# Patient Record
Sex: FEMALE | Race: WHITE | NOT HISPANIC OR LATINO | Employment: UNEMPLOYED | URBAN - METROPOLITAN AREA
[De-identification: names, ages, dates, MRNs, and addresses within clinical notes are randomized per-mention and may not be internally consistent; named-entity substitution may affect disease eponyms.]

---

## 2020-01-21 ENCOUNTER — HOSPITAL ENCOUNTER (INPATIENT)
Facility: HOSPITAL | Age: 45
LOS: 3 days | Discharge: HOME | End: 2020-01-24
Attending: INTERNAL MEDICINE | Admitting: INTERNAL MEDICINE

## 2020-01-21 ENCOUNTER — APPOINTMENT (OUTPATIENT)
Dept: CT IMAGING | Facility: HOSPITAL | Age: 45
End: 2020-01-21

## 2020-01-21 ENCOUNTER — APPOINTMENT (OUTPATIENT)
Dept: CARDIOLOGY | Facility: HOSPITAL | Age: 45
End: 2020-01-21

## 2020-01-21 ENCOUNTER — HOSPITAL ENCOUNTER (INPATIENT)
Facility: HOSPITAL | Age: 45
LOS: 1 days | Discharge: ANOTHER HOSPITAL | End: 2020-01-21
Attending: EMERGENCY MEDICINE | Admitting: INTERNAL MEDICINE

## 2020-01-21 VITALS
RESPIRATION RATE: 16 BRPM | BODY MASS INDEX: 39.91 KG/M2 | SYSTOLIC BLOOD PRESSURE: 116 MMHG | OXYGEN SATURATION: 91 % | DIASTOLIC BLOOD PRESSURE: 70 MMHG | HEIGHT: 71 IN | TEMPERATURE: 98.2 F | WEIGHT: 285.06 LBS | HEART RATE: 101 BPM

## 2020-01-21 DIAGNOSIS — I26.09 OTHER ACUTE PULMONARY EMBOLISM WITH ACUTE COR PULMONALE (HCC): Primary | ICD-10-CM

## 2020-01-21 DIAGNOSIS — I26.94 MULTIPLE SUBSEGMENTAL PULMONARY EMBOLI WITHOUT ACUTE COR PULMONALE (HCC): Primary | ICD-10-CM

## 2020-01-21 DIAGNOSIS — I82.492 ACUTE DEEP VEIN THROMBOSIS (DVT) OF OTHER SPECIFIED VEIN OF LEFT LOWER EXTREMITY (HCC): ICD-10-CM

## 2020-01-21 PROBLEM — I82.402 ACUTE DEEP VEIN THROMBOSIS (DVT) OF LEFT LOWER EXTREMITY (HCC): Status: ACTIVE | Noted: 2020-01-21

## 2020-01-21 PROBLEM — F41.9 ANXIETY DISORDER: Status: ACTIVE | Noted: 2020-01-21

## 2020-01-21 PROBLEM — R06.09 DYSPNEA ON EXERTION: Status: ACTIVE | Noted: 2020-01-21

## 2020-01-21 LAB
ANION GAP SERPL CALCULATED.3IONS-SCNC: 16 MMOL/L (ref 5–15)
APTT PPP: 16.7 SECONDS (ref 24–31)
APTT PPP: 36.7 SECONDS (ref 22.7–35.4)
BASOPHILS # BLD AUTO: 0.04 10*3/MM3 (ref 0–0.2)
BASOPHILS # BLD AUTO: 0.1 10*3/MM3 (ref 0–0.2)
BASOPHILS NFR BLD AUTO: 0.3 % (ref 0–1.5)
BASOPHILS NFR BLD AUTO: 0.4 % (ref 0–1.5)
BH CV ECHO MEAS - ACS: 2.2 CM
BH CV ECHO MEAS - AO MAX PG (FULL): 5.2 MMHG
BH CV ECHO MEAS - AO MAX PG: 8.2 MMHG
BH CV ECHO MEAS - AO MEAN PG (FULL): 2.3 MMHG
BH CV ECHO MEAS - AO MEAN PG: 4.3 MMHG
BH CV ECHO MEAS - AO ROOT AREA (BSA CORRECTED): 1.5
BH CV ECHO MEAS - AO ROOT AREA: 10 CM^2
BH CV ECHO MEAS - AO ROOT DIAM: 3.6 CM
BH CV ECHO MEAS - AO V2 MAX: 142.8 CM/SEC
BH CV ECHO MEAS - AO V2 MEAN: 96.1 CM/SEC
BH CV ECHO MEAS - AO V2 VTI: 23.5 CM
BH CV ECHO MEAS - ASC AORTA: 3.6 CM
BH CV ECHO MEAS - AVA(I,A): 2.9 CM^2
BH CV ECHO MEAS - AVA(I,D): 2.9 CM^2
BH CV ECHO MEAS - AVA(V,A): 2.6 CM^2
BH CV ECHO MEAS - AVA(V,D): 2.6 CM^2
BH CV ECHO MEAS - BSA(HAYCOCK): 2.6 M^2
BH CV ECHO MEAS - BSA: 2.4 M^2
BH CV ECHO MEAS - BZI_BMI: 39.6 KILOGRAMS/M^2
BH CV ECHO MEAS - BZI_METRIC_HEIGHT: 180.3 CM
BH CV ECHO MEAS - BZI_METRIC_WEIGHT: 128.8 KG
BH CV ECHO MEAS - EDV(CUBED): 37.1 ML
BH CV ECHO MEAS - EDV(MOD-SP2): 72.5 ML
BH CV ECHO MEAS - EDV(MOD-SP4): 60.1 ML
BH CV ECHO MEAS - EDV(TEICH): 45.3 ML
BH CV ECHO MEAS - EF(CUBED): 47.3 %
BH CV ECHO MEAS - EF(MOD-BP): 58 %
BH CV ECHO MEAS - EF(MOD-SP2): 62.2 %
BH CV ECHO MEAS - EF(MOD-SP4): 56.3 %
BH CV ECHO MEAS - EF(TEICH): 40.7 %
BH CV ECHO MEAS - ESV(CUBED): 19.5 ML
BH CV ECHO MEAS - ESV(MOD-SP2): 27.4 ML
BH CV ECHO MEAS - ESV(MOD-SP4): 26.3 ML
BH CV ECHO MEAS - ESV(TEICH): 26.9 ML
BH CV ECHO MEAS - FS: 19.2 %
BH CV ECHO MEAS - IVS/LVPW: 0.95
BH CV ECHO MEAS - IVSD: 1 CM
BH CV ECHO MEAS - LA DIMENSION(2D): 3.3 CM
BH CV ECHO MEAS - LA DIMENSION: 3.6 CM
BH CV ECHO MEAS - LA/AO: 1
BH CV ECHO MEAS - LV DIASTOLIC VOL/BSA (35-75): 24.6 ML/M^2
BH CV ECHO MEAS - LV MASS(C)D: 107 GRAMS
BH CV ECHO MEAS - LV MASS(C)DI: 43.7 GRAMS/M^2
BH CV ECHO MEAS - LV MAX PG: 2.9 MMHG
BH CV ECHO MEAS - LV MEAN PG: 2 MMHG
BH CV ECHO MEAS - LV SYSTOLIC VOL/BSA (12-30): 10.7 ML/M^2
BH CV ECHO MEAS - LV V1 MAX: 85.7 CM/SEC
BH CV ECHO MEAS - LV V1 MEAN: 69.3 CM/SEC
BH CV ECHO MEAS - LV V1 VTI: 15.7 CM
BH CV ECHO MEAS - LVIDD: 3.3 CM
BH CV ECHO MEAS - LVIDS: 2.7 CM
BH CV ECHO MEAS - LVOT AREA: 4.3 CM^2
BH CV ECHO MEAS - LVOT DIAM: 2.3 CM
BH CV ECHO MEAS - LVPWD: 1.1 CM
BH CV ECHO MEAS - MV A MAX VEL: 54.8 CM/SEC
BH CV ECHO MEAS - MV DEC SLOPE: 365.2 CM/SEC^2
BH CV ECHO MEAS - MV DEC TIME: 0.14 SEC
BH CV ECHO MEAS - MV E MAX VEL: 52.3 CM/SEC
BH CV ECHO MEAS - MV E/A: 0.95
BH CV ECHO MEAS - MV MAX PG: 1.7 MMHG
BH CV ECHO MEAS - MV MEAN PG: 0.58 MMHG
BH CV ECHO MEAS - MV V2 MAX: 65.2 CM/SEC
BH CV ECHO MEAS - MV V2 MEAN: 33.7 CM/SEC
BH CV ECHO MEAS - MV V2 VTI: 14 CM
BH CV ECHO MEAS - MVA(VTI): 4.8 CM^2
BH CV ECHO MEAS - PA ACC TIME: 0.09 SEC
BH CV ECHO MEAS - PA MAX PG (FULL): 0.2 MMHG
BH CV ECHO MEAS - PA MAX PG: 2.7 MMHG
BH CV ECHO MEAS - PA PR(ACCEL): 37.5 MMHG
BH CV ECHO MEAS - PA V2 MAX: 81.8 CM/SEC
BH CV ECHO MEAS - RAP SYSTOLE: 8 MMHG
BH CV ECHO MEAS - RV MAX PG: 2.5 MMHG
BH CV ECHO MEAS - RV MEAN PG: 1.5 MMHG
BH CV ECHO MEAS - RV V1 MAX: 78.7 CM/SEC
BH CV ECHO MEAS - RV V1 MEAN: 59.1 CM/SEC
BH CV ECHO MEAS - RV V1 VTI: 14.2 CM
BH CV ECHO MEAS - RVDD: 3.7 CM
BH CV ECHO MEAS - RVSP: 42.2 MMHG
BH CV ECHO MEAS - SI(AO): 95.8 ML/M^2
BH CV ECHO MEAS - SI(CUBED): 7.2 ML/M^2
BH CV ECHO MEAS - SI(LVOT): 27.6 ML/M^2
BH CV ECHO MEAS - SI(MOD-SP2): 18.4 ML/M^2
BH CV ECHO MEAS - SI(MOD-SP4): 13.8 ML/M^2
BH CV ECHO MEAS - SI(TEICH): 7.5 ML/M^2
BH CV ECHO MEAS - SV(AO): 234.5 ML
BH CV ECHO MEAS - SV(CUBED): 17.6 ML
BH CV ECHO MEAS - SV(LVOT): 67.4 ML
BH CV ECHO MEAS - SV(MOD-SP2): 45.1 ML
BH CV ECHO MEAS - SV(MOD-SP4): 33.9 ML
BH CV ECHO MEAS - SV(TEICH): 18.4 ML
BH CV ECHO MEAS - TR MAX VEL: 291.9 CM/SEC
BH CV LOW VAS LEFT GASTRONEMIUS VESSEL: 1
BH CV LOWER VASCULAR LEFT COMMON FEMORAL AUGMENT: NORMAL
BH CV LOWER VASCULAR LEFT COMMON FEMORAL COMPETENT: NORMAL
BH CV LOWER VASCULAR LEFT COMMON FEMORAL COMPRESS: NORMAL
BH CV LOWER VASCULAR LEFT COMMON FEMORAL PHASIC: NORMAL
BH CV LOWER VASCULAR LEFT COMMON FEMORAL SPONT: NORMAL
BH CV LOWER VASCULAR LEFT DISTAL FEMORAL COMPRESS: NORMAL
BH CV LOWER VASCULAR LEFT GASTRONEMIUS COMPRESS: NORMAL
BH CV LOWER VASCULAR LEFT GASTRONEMIUS THROMBUS: NORMAL
BH CV LOWER VASCULAR LEFT GREATER SAPH AK COMPRESS: NORMAL
BH CV LOWER VASCULAR LEFT GREATER SAPH BK COMPRESS: NORMAL
BH CV LOWER VASCULAR LEFT LESSER SAPH COMPRESS: NORMAL
BH CV LOWER VASCULAR LEFT MID FEMORAL AUGMENT: NORMAL
BH CV LOWER VASCULAR LEFT MID FEMORAL COMPETENT: NORMAL
BH CV LOWER VASCULAR LEFT MID FEMORAL COMPRESS: NORMAL
BH CV LOWER VASCULAR LEFT MID FEMORAL PHASIC: NORMAL
BH CV LOWER VASCULAR LEFT MID FEMORAL SPONT: NORMAL
BH CV LOWER VASCULAR LEFT PERONEAL COMPRESS: NORMAL
BH CV LOWER VASCULAR LEFT POPLITEAL AUGMENT: NORMAL
BH CV LOWER VASCULAR LEFT POPLITEAL COMPETENT: NORMAL
BH CV LOWER VASCULAR LEFT POPLITEAL COMPRESS: NORMAL
BH CV LOWER VASCULAR LEFT POPLITEAL PHASIC: NORMAL
BH CV LOWER VASCULAR LEFT POPLITEAL SPONT: NORMAL
BH CV LOWER VASCULAR LEFT POSTERIOR TIBIAL COMPRESS: NORMAL
BH CV LOWER VASCULAR LEFT PROXIMAL FEMORAL COMPRESS: NORMAL
BH CV LOWER VASCULAR LEFT SAPHENOFEMORAL JUNCTION AUGMENT: NORMAL
BH CV LOWER VASCULAR LEFT SAPHENOFEMORAL JUNCTION COMPETENT: NORMAL
BH CV LOWER VASCULAR LEFT SAPHENOFEMORAL JUNCTION COMPRESS: NORMAL
BH CV LOWER VASCULAR LEFT SAPHENOFEMORAL JUNCTION PHASIC: NORMAL
BH CV LOWER VASCULAR LEFT SAPHENOFEMORAL JUNCTION SPONT: NORMAL
BH CV LOWER VASCULAR RIGHT COMMON FEMORAL AUGMENT: NORMAL
BH CV LOWER VASCULAR RIGHT COMMON FEMORAL COMPETENT: NORMAL
BH CV LOWER VASCULAR RIGHT COMMON FEMORAL COMPRESS: NORMAL
BH CV LOWER VASCULAR RIGHT COMMON FEMORAL PHASIC: NORMAL
BH CV LOWER VASCULAR RIGHT COMMON FEMORAL SPONT: NORMAL
BUN BLD-MCNC: 11 MG/DL (ref 6–20)
BUN/CREAT SERPL: 11.7 (ref 7–25)
CALCIUM SPEC-SCNC: 9.9 MG/DL (ref 8.6–10.5)
CHLORIDE SERPL-SCNC: 101 MMOL/L (ref 98–107)
CO2 SERPL-SCNC: 23 MMOL/L (ref 22–29)
CREAT BLD-MCNC: 0.94 MG/DL (ref 0.57–1)
DEPRECATED RDW RBC AUTO: 38.3 FL (ref 37–54)
DEPRECATED RDW RBC AUTO: 39.8 FL (ref 37–54)
EOSINOPHIL # BLD AUTO: 0 10*3/MM3 (ref 0–0.4)
EOSINOPHIL # BLD AUTO: 0.04 10*3/MM3 (ref 0–0.4)
EOSINOPHIL NFR BLD AUTO: 0.2 % (ref 0.3–6.2)
EOSINOPHIL NFR BLD AUTO: 0.3 % (ref 0.3–6.2)
ERYTHROCYTE [DISTWIDTH] IN BLOOD BY AUTOMATED COUNT: 12 % (ref 12.3–15.4)
ERYTHROCYTE [DISTWIDTH] IN BLOOD BY AUTOMATED COUNT: 12.8 % (ref 12.3–15.4)
GFR SERPL CREATININE-BSD FRML MDRD: 65 ML/MIN/1.73
GLUCOSE BLD-MCNC: 109 MG/DL (ref 65–99)
HCT VFR BLD AUTO: 37.6 % (ref 34–46.6)
HCT VFR BLD AUTO: 42.2 % (ref 34–46.6)
HGB BLD-MCNC: 12.5 G/DL (ref 12–15.9)
HGB BLD-MCNC: 14.4 G/DL (ref 12–15.9)
HOLD SPECIMEN: NORMAL
IMM GRANULOCYTES # BLD AUTO: 0.04 10*3/MM3 (ref 0–0.05)
IMM GRANULOCYTES NFR BLD AUTO: 0.3 % (ref 0–0.5)
INR PPP: 0.97 (ref 0.9–1.1)
INR PPP: 1.07 (ref 0.9–1.1)
LV EF 2D ECHO EST: 55 %
LYMPHOCYTES # BLD AUTO: 4 10*3/MM3 (ref 0.7–3.1)
LYMPHOCYTES # BLD AUTO: 6.61 10*3/MM3 (ref 0.7–3.1)
LYMPHOCYTES NFR BLD AUTO: 26.2 % (ref 19.6–45.3)
LYMPHOCYTES NFR BLD AUTO: 43.5 % (ref 19.6–45.3)
MCH RBC QN AUTO: 28.9 PG (ref 26.6–33)
MCH RBC QN AUTO: 30.2 PG (ref 26.6–33)
MCHC RBC AUTO-ENTMCNC: 33.2 G/DL (ref 31.5–35.7)
MCHC RBC AUTO-ENTMCNC: 34.1 G/DL (ref 31.5–35.7)
MCV RBC AUTO: 86.8 FL (ref 79–97)
MCV RBC AUTO: 88.6 FL (ref 79–97)
MONOCYTES # BLD AUTO: 0.67 10*3/MM3 (ref 0.1–0.9)
MONOCYTES # BLD AUTO: 0.9 10*3/MM3 (ref 0.1–0.9)
MONOCYTES NFR BLD AUTO: 4.4 % (ref 5–12)
MONOCYTES NFR BLD AUTO: 5.7 % (ref 5–12)
MRSA DNA SPEC QL NAA+PROBE: NORMAL
NEUTROPHILS # BLD AUTO: 10.4 10*3/MM3 (ref 1.7–7)
NEUTROPHILS # BLD AUTO: 7.78 10*3/MM3 (ref 1.7–7)
NEUTROPHILS NFR BLD AUTO: 51.2 % (ref 42.7–76)
NEUTROPHILS NFR BLD AUTO: 67.5 % (ref 42.7–76)
NRBC BLD AUTO-RTO: 0 /100 WBC (ref 0–0.2)
NRBC BLD AUTO-RTO: 0.1 /100 WBC (ref 0–0.2)
PLATELET # BLD AUTO: 300 10*3/MM3 (ref 140–450)
PLATELET # BLD AUTO: 392 10*3/MM3 (ref 140–450)
PMV BLD AUTO: 7.6 FL (ref 6–12)
PMV BLD AUTO: 9.4 FL (ref 6–12)
POTASSIUM BLD-SCNC: 3.9 MMOL/L (ref 3.5–5.2)
PROTHROMBIN TIME: 10.2 SECONDS (ref 9.6–11.7)
PROTHROMBIN TIME: 13.6 SECONDS (ref 11.7–14.2)
RBC # BLD AUTO: 4.33 10*6/MM3 (ref 3.77–5.28)
RBC # BLD AUTO: 4.76 10*6/MM3 (ref 3.77–5.28)
SODIUM BLD-SCNC: 140 MMOL/L (ref 136–145)
TROPONIN T SERPL-MCNC: 0.04 NG/ML (ref 0–0.03)
WBC NRBC COR # BLD: 15.18 10*3/MM3 (ref 3.4–10.8)
WBC NRBC COR # BLD: 15.4 10*3/MM3 (ref 3.4–10.8)

## 2020-01-21 PROCEDURE — 25010000002 HEPARIN (PORCINE) PER 1000 UNITS: Performed by: INTERNAL MEDICINE

## 2020-01-21 PROCEDURE — 80048 BASIC METABOLIC PNL TOTAL CA: CPT | Performed by: EMERGENCY MEDICINE

## 2020-01-21 PROCEDURE — 85025 COMPLETE CBC W/AUTO DIFF WBC: CPT | Performed by: EMERGENCY MEDICINE

## 2020-01-21 PROCEDURE — 0 IOPAMIDOL PER 1 ML: Performed by: EMERGENCY MEDICINE

## 2020-01-21 PROCEDURE — 85025 COMPLETE CBC W/AUTO DIFF WBC: CPT | Performed by: INTERNAL MEDICINE

## 2020-01-21 PROCEDURE — 85730 THROMBOPLASTIN TIME PARTIAL: CPT | Performed by: INTERNAL MEDICINE

## 2020-01-21 PROCEDURE — 85730 THROMBOPLASTIN TIME PARTIAL: CPT | Performed by: EMERGENCY MEDICINE

## 2020-01-21 PROCEDURE — 84484 ASSAY OF TROPONIN QUANT: CPT | Performed by: NURSE PRACTITIONER

## 2020-01-21 PROCEDURE — 85610 PROTHROMBIN TIME: CPT | Performed by: EMERGENCY MEDICINE

## 2020-01-21 PROCEDURE — 93306 TTE W/DOPPLER COMPLETE: CPT | Performed by: INTERNAL MEDICINE

## 2020-01-21 PROCEDURE — 93306 TTE W/DOPPLER COMPLETE: CPT

## 2020-01-21 PROCEDURE — 71275 CT ANGIOGRAPHY CHEST: CPT

## 2020-01-21 PROCEDURE — 25010000002 SULFUR HEXAFLUORIDE MICROSPH 60.7-25 MG RECONSTITUTED SUSPENSION: Performed by: EMERGENCY MEDICINE

## 2020-01-21 PROCEDURE — 82962 GLUCOSE BLOOD TEST: CPT

## 2020-01-21 PROCEDURE — 25010000002 HEPARIN (PORCINE) PER 1000 UNITS: Performed by: EMERGENCY MEDICINE

## 2020-01-21 PROCEDURE — 93971 EXTREMITY STUDY: CPT

## 2020-01-21 PROCEDURE — 93005 ELECTROCARDIOGRAM TRACING: CPT

## 2020-01-21 PROCEDURE — 87641 MR-STAPH DNA AMP PROBE: CPT | Performed by: INTERNAL MEDICINE

## 2020-01-21 PROCEDURE — 85610 PROTHROMBIN TIME: CPT | Performed by: INTERNAL MEDICINE

## 2020-01-21 PROCEDURE — 83880 ASSAY OF NATRIURETIC PEPTIDE: CPT | Performed by: INTERNAL MEDICINE

## 2020-01-21 PROCEDURE — 93005 ELECTROCARDIOGRAM TRACING: CPT | Performed by: EMERGENCY MEDICINE

## 2020-01-21 PROCEDURE — 99284 EMERGENCY DEPT VISIT MOD MDM: CPT

## 2020-01-21 RX ORDER — MULTIPLE VITAMINS W/ MINERALS TAB 9MG-400MCG
1 TAB ORAL DAILY
COMMUNITY

## 2020-01-21 RX ORDER — ALPRAZOLAM 0.25 MG/1
0.25 TABLET ORAL 2 TIMES DAILY PRN
Status: DISCONTINUED | OUTPATIENT
Start: 2020-01-21 | End: 2020-01-21 | Stop reason: HOSPADM

## 2020-01-21 RX ORDER — HEPARIN SODIUM 10000 [USP'U]/100ML
11.6 INJECTION, SOLUTION INTRAVENOUS
Status: CANCELLED | OUTPATIENT
Start: 2020-01-21

## 2020-01-21 RX ORDER — PANTOPRAZOLE SODIUM 40 MG/1
40 TABLET, DELAYED RELEASE ORAL EVERY MORNING
Status: DISCONTINUED | OUTPATIENT
Start: 2020-01-21 | End: 2020-01-21 | Stop reason: HOSPADM

## 2020-01-21 RX ORDER — MORPHINE SULFATE 2 MG/ML
2 INJECTION, SOLUTION INTRAMUSCULAR; INTRAVENOUS
Status: DISCONTINUED | OUTPATIENT
Start: 2020-01-21 | End: 2020-01-24 | Stop reason: HOSPADM

## 2020-01-21 RX ORDER — HEPARIN SODIUM 10000 [USP'U]/100ML
11.6 INJECTION, SOLUTION INTRAVENOUS
Status: DISCONTINUED | OUTPATIENT
Start: 2020-01-21 | End: 2020-01-21 | Stop reason: HOSPADM

## 2020-01-21 RX ORDER — CETIRIZINE HYDROCHLORIDE 10 MG/1
10 TABLET ORAL DAILY
Status: DISCONTINUED | OUTPATIENT
Start: 2020-01-21 | End: 2020-01-21 | Stop reason: HOSPADM

## 2020-01-21 RX ORDER — SODIUM CHLORIDE 0.9 % (FLUSH) 0.9 %
10 SYRINGE (ML) INJECTION AS NEEDED
Status: CANCELLED | OUTPATIENT
Start: 2020-01-21

## 2020-01-21 RX ORDER — SODIUM CHLORIDE 0.9 % (FLUSH) 0.9 %
10 SYRINGE (ML) INJECTION AS NEEDED
Status: DISCONTINUED | OUTPATIENT
Start: 2020-01-21 | End: 2020-01-21 | Stop reason: HOSPADM

## 2020-01-21 RX ORDER — MULTIVITAMIN,THERAPEUTIC
1 TABLET ORAL DAILY
Status: CANCELLED | OUTPATIENT
Start: 2020-01-22

## 2020-01-21 RX ORDER — PANTOPRAZOLE SODIUM 40 MG/1
40 TABLET, DELAYED RELEASE ORAL EVERY MORNING
Status: CANCELLED | OUTPATIENT
Start: 2020-01-22

## 2020-01-21 RX ORDER — CETIRIZINE HYDROCHLORIDE 10 MG/1
10 TABLET ORAL DAILY
Status: CANCELLED | OUTPATIENT
Start: 2020-01-22

## 2020-01-21 RX ORDER — SODIUM CHLORIDE 0.9 % (FLUSH) 0.9 %
10 SYRINGE (ML) INJECTION EVERY 12 HOURS SCHEDULED
Status: DISCONTINUED | OUTPATIENT
Start: 2020-01-21 | End: 2020-01-21 | Stop reason: HOSPADM

## 2020-01-21 RX ORDER — ZOLPIDEM TARTRATE 5 MG/1
5 TABLET ORAL NIGHTLY PRN
Status: DISCONTINUED | OUTPATIENT
Start: 2020-01-21 | End: 2020-01-24 | Stop reason: HOSPADM

## 2020-01-21 RX ORDER — MULTIVITAMIN,THERAPEUTIC
1 TABLET ORAL DAILY
Status: DISCONTINUED | OUTPATIENT
Start: 2020-01-21 | End: 2020-01-21 | Stop reason: HOSPADM

## 2020-01-21 RX ORDER — ALPRAZOLAM 0.25 MG/1
0.25 TABLET ORAL 2 TIMES DAILY PRN
COMMUNITY
End: 2020-02-25

## 2020-01-21 RX ORDER — ESCITALOPRAM OXALATE 10 MG/1
20 TABLET ORAL NIGHTLY
Status: CANCELLED | OUTPATIENT
Start: 2020-01-21

## 2020-01-21 RX ORDER — CETIRIZINE HYDROCHLORIDE 10 MG/1
10 TABLET ORAL DAILY
COMMUNITY

## 2020-01-21 RX ORDER — ESCITALOPRAM OXALATE 10 MG/1
20 TABLET ORAL NIGHTLY
Status: DISCONTINUED | OUTPATIENT
Start: 2020-01-21 | End: 2020-01-21 | Stop reason: HOSPADM

## 2020-01-21 RX ORDER — OMEPRAZOLE 20 MG/1
20 CAPSULE, DELAYED RELEASE ORAL
COMMUNITY

## 2020-01-21 RX ORDER — ALPRAZOLAM 0.25 MG/1
0.25 TABLET ORAL 2 TIMES DAILY PRN
Status: CANCELLED | OUTPATIENT
Start: 2020-01-21

## 2020-01-21 RX ORDER — SODIUM CHLORIDE 0.9 % (FLUSH) 0.9 %
10 SYRINGE (ML) INJECTION EVERY 12 HOURS SCHEDULED
Status: CANCELLED | OUTPATIENT
Start: 2020-01-21

## 2020-01-21 RX ORDER — HEPARIN SODIUM 10000 [USP'U]/100ML
11.6 INJECTION, SOLUTION INTRAVENOUS
Status: DISCONTINUED | OUTPATIENT
Start: 2020-01-21 | End: 2020-01-23

## 2020-01-21 RX ORDER — ESCITALOPRAM OXALATE 20 MG/1
20 TABLET ORAL NIGHTLY
Status: DISCONTINUED | OUTPATIENT
Start: 2020-01-21 | End: 2020-01-24 | Stop reason: HOSPADM

## 2020-01-21 RX ORDER — ESCITALOPRAM OXALATE 20 MG/1
20 TABLET ORAL
COMMUNITY

## 2020-01-21 RX ADMIN — IOPAMIDOL 100 ML: 755 INJECTION, SOLUTION INTRAVENOUS at 12:30

## 2020-01-21 RX ADMIN — SODIUM CHLORIDE 1000 ML: 0.9 INJECTION, SOLUTION INTRAVENOUS at 11:25

## 2020-01-21 RX ADMIN — HEPARIN SODIUM AND DEXTROSE 11.6 UNITS/KG/HR: 10000; 5 INJECTION INTRAVENOUS at 15:06

## 2020-01-21 RX ADMIN — HEPARIN SODIUM 11.6 UNITS/KG/HR: 10000 INJECTION, SOLUTION INTRAVENOUS at 21:08

## 2020-01-21 RX ADMIN — Medication 10 ML: at 16:21

## 2020-01-21 RX ADMIN — SULFUR HEXAFLUORIDE 2 ML: KIT at 14:10

## 2020-01-21 NOTE — DISCHARGE SUMMARY
Pulmonary/ Critical Care/ sleep medicine discharge summary Note    Date of Discharge:  1/21/2020    Discharge Diagnosis: Acute bilateral pulmonary emboli, acute left lower extremity DVT    Presenting Problem/History of Present Illness  Active Hospital Problems    Diagnosis  POA   • Multiple subsegmental pulmonary emboli without acute cor pulmonale [I26.94]  Yes   • Dyspnea on exertion [R06.09]  Yes   • Acute deep vein thrombosis (DVT) of left lower extremity (CMS/HCC) [I82.402]  Yes   • Anxiety disorder [F41.9]  Yes      Resolved Hospital Problems   No resolved problems to display.          Hospital Course  Admit: 1/21/20    Per H&P:  Merry Melara is a 44 y.o. female with a PMH sig for anxiety presented to the ED with complaints of worsening shortness of air with activity x 2 days.  Shortness of air is better at rest.  She reported some chest discomfort with deep breathing.  She reported no increase in swelling or pain to her left lower extremity.  Of note, she underwent a surgical procedure on 12/26/19 to remove some necrotic bone and cartilage that occurred as a result of a stress fracture.  She also had a tendon release in the same extremity.  CT Chest PE was completed and showed positive for bilateral pulmonary emboli involving all lungs.  A lower extremity venous bilateral doppler study was positive for a left lower extremity DVT.  ECHO completed and report pending. She will be admitted and started on a heparin gtt.      ASSESSMENT & PLAN    Acute submassive Bilateral pulmonary emboli   -s/p recent left lower extremity surgery on 12/26/20, left foot in post op boot  -CT PE study reviewed.  Positive for bilateral PE involving all lungs  -start heparin gtt, will transition to PO at discharge.  Will need at least 6 month of anti-coagulant treatment      Dyspnea on exertion  -secondary to above  -6 minute walk prior to discharge  -can use supplemental oxygen as needed to maintain sats 92%, on RA  -ECHO :  · Right  ventricular cavity is moderate-to-severely dilated.  · Moderately reduced right ventricular systolic function noted.  · RV contractile morphology consistent with positive Paredes sign     Acute deep vein thrombosis (DVT) of left lower extremity (CMS/HCC)  -venous doppler reviewed      Anxiety disorder  -resume home medication        DVT ppy:  -heparin gtt      PPI ppy:  -protonix (home med)     Full Code      RV strain on echo with RV dilation and dysfunction. Given her significant clot burden, she will benefit from EKOS or clot extraction procedure. Will transfer the patient to [facility]. Accepted by Dr. Bazzi.    Procedures Performed         Labs/radiological studies:  Lab Results (last 72 hours)     Procedure Component Value Units Date/Time    MRSA Screen Culture - Swab, Nares [[id number]] Collected:  01/21/20 1621    Specimen:  Swab from Nares Updated:  01/21/20 1647    Extra Tubes [[id number]] Collected:  01/21/20 1450    Specimen:  Blood from Arm, Left Updated:  01/21/20 1600    Narrative:       The following orders were created for panel order Extra Tubes.  Procedure                               Abnormality         Status                     ---------                               -----------         ------                     Gold Top - SST[[id number]]                                   Final result                 Please view results for these tests on the individual orders.    Gold Top - SST [[id number]] Collected:  01/21/20 1450    Specimen:  Blood from Arm, Left Updated:  01/21/20 1600     Extra Tube Hold for add-ons.     Comment: Auto resulted.       aPTT [[id number]]  (Abnormal) Collected:  01/21/20 1449    Specimen:  Blood from Arm, Right Updated:  01/21/20 1511     PTT 16.7 seconds     Protime-INR [[id number]]  (Normal) Collected:  01/21/20 1449    Specimen:  Blood from Arm, Right Updated:  01/21/20 1511     Protime 10.2 Seconds      INR 0.97    Basic Metabolic Panel [[id number]]  (Abnormal)  Collected:  01/21/20 1131    Specimen:  Blood Updated:  01/21/20 1157     Glucose 109 mg/dL      BUN 11 mg/dL      Creatinine 0.94 mg/dL      Sodium 140 mmol/L      Potassium 3.9 mmol/L      Chloride 101 mmol/L      CO2 23.0 mmol/L      Calcium 9.9 mg/dL      eGFR Non African Amer 65 mL/min/1.73      BUN/Creatinine Ratio 11.7     Anion Gap 16.0 mmol/L     Narrative:       GFR Normal >60  Chronic Kidney Disease <60  Kidney Failure <15      CBC & Differential [[id number]] Collected:  01/21/20 1131    Specimen:  Blood Updated:  01/21/20 1135    Narrative:       The following orders were created for panel order CBC & Differential.  Procedure                               Abnormality         Status                     ---------                               -----------         ------                     CBC Auto Differential[[id number]]        Abnormal            Final result                 Please view results for these tests on the individual orders.    CBC Auto Differential [[id number]]  (Abnormal) Collected:  01/21/20 1131    Specimen:  Blood Updated:  01/21/20 1135     WBC 15.40 10*3/mm3      RBC 4.76 10*6/mm3      Hemoglobin 14.4 g/dL      Hematocrit 42.2 %      MCV 88.6 fL      MCH 30.2 pg      MCHC 34.1 g/dL      RDW 12.8 %      RDW-SD 39.8 fl      MPV 7.6 fL      Platelets 392 10*3/mm3      Neutrophil % 67.5 %      Lymphocyte % 26.2 %      Monocyte % 5.7 %      Eosinophil % 0.2 %      Basophil % 0.4 %      Neutrophils, Absolute 10.40 10*3/mm3      Lymphocytes, Absolute 4.00 10*3/mm3      Monocytes, Absolute 0.90 10*3/mm3      Eosinophils, Absolute 0.00 10*3/mm3      Basophils, Absolute 0.10 10*3/mm3      nRBC 0.1 /100 WBC         Ct Chest Pulmonary Embolism    Result Date: 1/21/2020  Large central bilateral pulmonary emboli,, involving all lobes of both lungs. The pulmonary emboli appear near occlusive in the proximal lobar branches. Signs of right heart strain. 2. Emergency room physician was notified prior to the  time of this dictation.    Electronically Signed By-Dr. Edita Crawford MD On:1/21/2020 1:03 PM This report was finalized on [id number] by Dr. Edita Crawford MD.      Consults:   Consults     Date and Time Order Name Status Description    1/21/2020 1429 Intensivist (on-call MD unless specified) Completed           Pertinent Test Results: as listed    Condition on Discharge:  stable    Vital Signs  Temp:  [97.6 °F (36.4 °C)-98.2 °F (36.8 °C)] 98.2 °F (36.8 °C)  Heart Rate:  [100-111] 101  Resp:  [16-20] 16  BP: (104-116)/(65-72) 116/70    Physical Exam:  Constitutional:  Well developed, well nourished, no acute distress, non-toxic appearance   Eyes:  PERRL, conjunctiva normal   HENT:  Atraumatic, external ears normal, nose normal, oropharynx moist, no pharyngeal exudates. Neck- normal range of motion, no tenderness, supple   Respiratory:  No respiratory distress, normal breath sounds, no rales, no wheezing   Cardiovascular:  Normal rate, normal rhythm, no murmurs, no gallops, no rubs   GI:  Soft, nondistended, normal bowel sounds, nontender, no organomegaly, no mass, no rebound, no guarding   :  No costovertebral angle tenderness   Musculoskeletal:  No edema, no tenderness, no deformities.  Left foot in a post surgical boot  Integument:  Well hydrated, no rash   Lymphatic:  No lymphadenopathy noted   Neurologic:  Alert & oriented x 3, CN 2-12 normal, normal motor function, normal sensory function, no focal deficits noted   Psychiatric:  Speech and behavior appropriate     Discharge Disposition  Transfer to Another Facility    Discharge Medications     Discharge Medications      ASK your doctor about these medications      Instructions Start Date   ALPRAZolam 0.25 MG tablet  Commonly known as:  XANAX   0.25 mg, Oral, 2 Times Daily PRN      cetirizine 10 MG tablet  Commonly known as:  zyrTEC   10 mg, Oral, Daily      escitalopram 20 MG tablet  Commonly known as:  LEXAPRO   20 mg, Oral, Every Night at Bedtime       multivitamin with minerals tablet tablet   1 tablet, Oral, Daily      omeprazole 20 MG capsule  Commonly known as:  priLOSEC   20 mg, Oral, Every Night at Bedtime             Discharge Diet: NPO    Activity at Discharge: bedrest    Follow-up Appointments  No future appointments.      Test Results Pending at Discharge   Order Current Status    MRSA Screen Culture - Swab, Nares In process           Time: Discharge 31 min

## 2020-01-21 NOTE — H&P
Pulmonary/ Critical Care/ sleep medicine ADMISSION H&P Note        Patient Name:  Merry Melara    :  1975    Medical Record:  [id number]    Primary Care Physician     Mary Rodgers MD    JAY JAY Melara is a 44 y.o. female with a PMH sig for anxiety presented to the ED with complaints of worsening shortness of air with activity x 2 days.  Shortness of air is better at rest.  She reported some chest discomfort with deep breathing.  She reported no increase in swelling or pain to her left lower extremity.  Of note, she underwent a surgical procedure on 19 to remove some necrotic bone and cartilage that occurred as a result of a stress fracture.  She also had a tendon release in the same extremity.  CT Chest PE was completed and showed positive for bilateral pulmonary emboli involving all lungs.  A lower extremity venous bilateral doppler study was positive for a left lower extremity DVT.  ECHO completed and report pending. She will be admitted and started on a heparin gtt.      Review of Systems    Constitutional:  Denies fever or chills   Eyes:  Denies change in visual acuity   HENT:  Denies nasal congestion or sore throat   Respiratory:  Denies cough + shortness of breath   Cardiovascular:  + chest pain no edema   GI:  Denies abdominal pain, nausea, vomiting, bloody stools or diarrhea   :  Denies dysuria   Musculoskeletal:  Denies back pain or joint pain   Integument:  Denies rash   Neurologic:  Denies headache, focal weakness or sensory changes   Endocrine:  Denies polyuria or polydipsia   Lymphatic:  Denies swollen glands   Psychiatric:  Denies depression + anxiety       Medical History    Past Medical History:   Diagnosis Date   • Anxiety         Surgical History    Past Surgical History:   Procedure Laterality Date   • CHOLECYSTECTOMY     • DILATATION AND CURETTAGE     • TENDON RELEASE     • TONSILLECTOMY          Family History    History reviewed. No pertinent family  history.    Social History    Social History     Tobacco Use   • Smoking status: Former Smoker     Last attempt to quit: 2004     Years since quittin.0   Substance Use Topics   • Alcohol use: Never     Frequency: Never     Comment: social        Allergies    No Known Allergies    Medications    Scheduled Meds:  cetirizine 10 mg Oral Daily   escitalopram 20 mg Oral Daily   heparin 10,000 Units Intravenous Once   [START ON 2020] pantoprazole 40 mg Oral QAM   sodium chloride 10 mL Intravenous Q12H   THERA 1 tablet Oral Daily     Continuous Infusions:  heparin 11.6 Units/kg/hr     PRN Meds:.•  ALPRAZolam  •  heparin  •  heparin  •  [COMPLETED] Insert peripheral IV **AND** sodium chloride  •  sodium chloride      Physical Exam    tMax 24 hrs:  Temp (24hrs), Av.6 °F (36.4 °C), Min:97.6 °F (36.4 °C), Max:97.6 °F (36.4 °C)    Vitals Ranges:  Temp:  [97.6 °F (36.4 °C)] 97.6 °F (36.4 °C)  Heart Rate:  [100-111] 100  Resp:  [20] 20  BP: (104-112)/(65-72) 104/72  Intake and Output Last 3 Shifts:  No intake/output data recorded.    Constitutional:  Well developed, well nourished, no acute distress, non-toxic appearance   Eyes:  PERRL, conjunctiva normal   HENT:  Atraumatic, external ears normal, nose normal, oropharynx moist, no pharyngeal exudates. Neck- normal range of motion, no tenderness, supple   Respiratory:  No respiratory distress, normal breath sounds, no rales, no wheezing   Cardiovascular:  Normal rate, normal rhythm, no murmurs, no gallops, no rubs   GI:  Soft, nondistended, normal bowel sounds, nontender, no organomegaly, no mass, no rebound, no guarding   :  No costovertebral angle tenderness   Musculoskeletal:  No edema, no tenderness, no deformities.  Left foot in a post surgical boot  Integument:  Well hydrated, no rash   Lymphatic:  No lymphadenopathy noted   Neurologic:  Alert & oriented x 3, CN 2-12 normal, normal motor function, normal sensory function, no focal deficits noted   Psychiatric:   Speech and behavior appropriate     labs    Lab Results (last 24 hours)     Procedure Component Value Units Date/Time    Protime-INR [[id number]] Collected:  01/21/20 1449    Specimen:  Blood from Arm, Right Updated:  01/21/20 1454    aPTT [[id number]] Collected:  01/21/20 1449    Specimen:  Blood from Arm, Right Updated:  01/21/20 1454    Extra Tubes [[id number]] Collected:  01/21/20 1450    Specimen:  Blood from Arm, Left Updated:  01/21/20 1454    Narrative:       The following orders were created for panel order Extra Tubes.  Procedure                               Abnormality         Status                     ---------                               -----------         ------                     Gold Top - SST[[id number]]                                   In process                   Please view results for these tests on the individual orders.    Gold Top - SST [[id number]] Collected:  01/21/20 1450    Specimen:  Blood from Arm, Left Updated:  01/21/20 1454    Basic Metabolic Panel [[id number]]  (Abnormal) Collected:  01/21/20 1131    Specimen:  Blood Updated:  01/21/20 1157     Glucose 109 mg/dL      BUN 11 mg/dL      Creatinine 0.94 mg/dL      Sodium 140 mmol/L      Potassium 3.9 mmol/L      Chloride 101 mmol/L      CO2 23.0 mmol/L      Calcium 9.9 mg/dL      eGFR Non African Amer 65 mL/min/1.73      BUN/Creatinine Ratio 11.7     Anion Gap 16.0 mmol/L     Narrative:       GFR Normal >60  Chronic Kidney Disease <60  Kidney Failure <15      CBC & Differential [[id number]] Collected:  01/21/20 1131    Specimen:  Blood Updated:  01/21/20 1135    Narrative:       The following orders were created for panel order CBC & Differential.  Procedure                               Abnormality         Status                     ---------                               -----------         ------                     CBC Auto Differential[[id number]]        Abnormal            Final result                 Please view results for  these tests on the individual orders.    CBC Auto Differential [[id number]]  (Abnormal) Collected:  01/21/20 1131    Specimen:  Blood Updated:  01/21/20 1135     WBC 15.40 10*3/mm3      RBC 4.76 10*6/mm3      Hemoglobin 14.4 g/dL      Hematocrit 42.2 %      MCV 88.6 fL      MCH 30.2 pg      MCHC 34.1 g/dL      RDW 12.8 %      RDW-SD 39.8 fl      MPV 7.6 fL      Platelets 392 10*3/mm3      Neutrophil % 67.5 %      Lymphocyte % 26.2 %      Monocyte % 5.7 %      Eosinophil % 0.2 %      Basophil % 0.4 %      Neutrophils, Absolute 10.40 10*3/mm3      Lymphocytes, Absolute 4.00 10*3/mm3      Monocytes, Absolute 0.90 10*3/mm3      Eosinophils, Absolute 0.00 10*3/mm3      Basophils, Absolute 0.10 10*3/mm3      nRBC 0.1 /100 WBC           Imaging & Other Studies    Imaging Results (Last 72 Hours)     Procedure Component Value Units Date/Time    CT Chest Pulmonary Embolism [[id number]] Collected:  01/21/20 1259     Updated:  01/21/20 1305    Narrative:       CT CHEST PULMONARY EMBOLISM-     Date of Exam: 1/21/2020 12:30 PM     Indication: Short of breath with exertion and recent left lower  extremity surgery.  . Elevated heart rate.     Comparison: None available.     Technique: Serial and axial CT images of the chest were obtained  following the uneventful intravenous administration of 100 cc  Isovue-370. contrast. Reconstructions in the coronal and sagittal planes  were also performed.  Automated exposure control and iterative  reconstruction methods were used.     FINDINGS:     Extensive bilateral pulmonary emboli are present. There is a large  embolism within the right main pulmonary artery, and there is near  occlusive embolism within the proximal right upper, right middle and  right lower lobe pulmonary arteries extending into the subsegmental  branches. There is embolic disease in the distal left main pulmonary  artery, with near occlusive thrombus within the left upper and left  lower lobe pulmonary arteries, extending  into the subsegmental branches.     There is concavity of the interventricular septum the heart suggesting  right heart strain. Heart size is normal. No pericardial effusion.     No acute airspace disease. Benign calcified granuloma within the right  lower lobe.           Cholecystectomy. Small second duodenal segment diverticulum. Remainder  of included upper abdominal organs are within normal limits.     No acute osseous abnormality.          Impression:       Large central bilateral pulmonary emboli,, involving all lobes of both  lungs. The pulmonary emboli appear near occlusive in the proximal lobar  branches. Signs of right heart strain.  2. Emergency room physician was notified prior to the time of this  dictation.           Electronically Signed By-Dr. Edita Crawford MD On:1/21/2020 1:03 PM  This report was finalized on [id number] by Dr. Edita Crawford MD.          Assessment    Acute submassive Bilateral pulmonary emboli   -s/p recent left lower extremity surgery on 12/26/20, left foot in post op boot  -CT PE study reviewed.  Positive for bilateral PE involving all lungs  -start heparin gtt, will transition to PO at discharge.  Will need at least 6 month of anti-coagulant treatment      Dyspnea on exertion  -secondary to above  -6 minute walk prior to discharge  -can use supplemental oxygen as needed to maintain sats 92%, on RA  -ECHO final report pending    Acute deep vein thrombosis (DVT) of left lower extremity (CMS/HCC)  -venous doppler reviewed     Anxiety disorder  -resume home medication      DVT ppy:  -heparin gtt     PPI ppy:  -protonix (home med)    Full Code    Attending physician statement:  Patient is critically ill.  Total critical care time spent is 32 minutes which does not include any time for procedures.  Critical care time is exclusive of time spent by the nurse practitioner.  Above note scribed by nurse practitioner for me and later reviewed by me for accuracy . I've examined the  patient and reviewed all labs and images.    D/w Dr Vidal and reviewed Echo. She does have RV strain on echo with RV dilation and dysfunction. Given her significant clot burden, she will benefit from EKOS or clot extraction procedure. Will transfer the patient to [facility]. Spoke with Dr Bazzi.    I have directly participated in the evaluation and management of this patient.  Jenni Kearney MD

## 2020-01-21 NOTE — ED PROVIDER NOTES
Subjective   History of Present Illness  44-year-old female with a history of anxiety disorder but no history of any lung or heart disease states she is 1 month post tendon release involving the Achilles tendon as well as removal of some avascular necrotic bone in the foot.  The patient has been in an immobilizer type of brace involving the left lower extremity.  She states that over the past 4 days she has had increasing shortness of breath with exertion.  She denies any cough congestion fever chills hemoptysis or chest pain.  The patient denies any sore throat.  There is been no nausea or vomiting.  She has noticed no swelling in the extremities.  Review of Systems    Past Medical History:   Diagnosis Date   • Anxiety        No Known Allergies    Past Surgical History:   Procedure Laterality Date   • CHOLECYSTECTOMY     • DILATATION AND CURETTAGE     • TENDON RELEASE     • TONSILLECTOMY         History reviewed. No pertinent family history.    Social History     Socioeconomic History   • Marital status:      Spouse name: Not on file   • Number of children: Not on file   • Years of education: Not on file   • Highest education level: Not on file   Tobacco Use   • Smoking status: Former Smoker     Last attempt to quit:      Years since quittin.0   Substance and Sexual Activity   • Alcohol use: Never     Frequency: Never     Comment: social   • Drug use: Never   • Sexual activity: Defer           Objective   Physical Exam  Patient is awake and alert she was afebrile vital signs are stable her O2 sat was 94% on room air the HEENT exam is unremarkable neck is supple her chest is clear cardiovascular exam reveals a regular rhythm without a gallop or murmur the abdomen is soft and nontender the patient has a cam walker on the left lower extremity and wounds that are healing well.  There was no edema or point tenderness over the calf or along the medial aspect of the thigh.  Procedures           ED Course                 Results for orders placed or performed during the hospital encounter of 01/21/20   Basic Metabolic Panel   Result Value Ref Range    Glucose 109 (H) 65 - 99 mg/dL    BUN 11 6 - 20 mg/dL    Creatinine 0.94 0.57 - 1.00 mg/dL    Sodium 140 136 - 145 mmol/L    Potassium 3.9 3.5 - 5.2 mmol/L    Chloride 101 98 - 107 mmol/L    CO2 23.0 22.0 - 29.0 mmol/L    Calcium 9.9 8.6 - 10.5 mg/dL    eGFR Non African Amer 65 >60 mL/min/1.73    BUN/Creatinine Ratio 11.7 7.0 - 25.0    Anion Gap 16.0 (H) 5.0 - 15.0 mmol/L   CBC Auto Differential   Result Value Ref Range    WBC 15.40 (H) 3.40 - 10.80 10*3/mm3    RBC 4.76 3.77 - 5.28 10*6/mm3    Hemoglobin 14.4 12.0 - 15.9 g/dL    Hematocrit 42.2 34.0 - 46.6 %    MCV 88.6 79.0 - 97.0 fL    MCH 30.2 26.6 - 33.0 pg    MCHC 34.1 31.5 - 35.7 g/dL    RDW 12.8 12.3 - 15.4 %    RDW-SD 39.8 37.0 - 54.0 fl    MPV 7.6 6.0 - 12.0 fL    Platelets 392 140 - 450 10*3/mm3    Neutrophil % 67.5 42.7 - 76.0 %    Lymphocyte % 26.2 19.6 - 45.3 %    Monocyte % 5.7 5.0 - 12.0 %    Eosinophil % 0.2 (L) 0.3 - 6.2 %    Basophil % 0.4 0.0 - 1.5 %    Neutrophils, Absolute 10.40 (H) 1.70 - 7.00 10*3/mm3    Lymphocytes, Absolute 4.00 (H) 0.70 - 3.10 10*3/mm3    Monocytes, Absolute 0.90 0.10 - 0.90 10*3/mm3    Eosinophils, Absolute 0.00 0.00 - 0.40 10*3/mm3    Basophils, Absolute 0.10 0.00 - 0.20 10*3/mm3    nRBC 0.1 0.0 - 0.2 /100 WBC   ADULT TRANSTHORACIC ECHO COMPLETE W/ CONT IF NECESSARY PER PROTOCOL   Result Value Ref Range    Target HR (85%) 150 bpm    Max. Pred. HR (100%) 176 bpm   Duplex Venous Lower Extremity - Left   Result Value Ref Range    Left GastronemiusSoleal Vessel 1     Right Common Femoral Spont Y     Right Common Femoral Phasic N     Right Common Femoral Augment Y     Right Common Femoral Competent Y     Right Common Femoral Compress C     Left Common Femoral Spont Y     Left Common Femoral Phasic N     Left Common Femoral Augment Y     Left Common Femoral Competent Y     Left Common  Femoral Compress C     Left Saphenofemoral Junction Spont Y     Left Saphenofemoral Junction Phasic N     Left Saphenofemoral Junction Augment Y     Left Saphenofemoral Junction Competent Y     Left Saphenofemoral Junction Compress C     Left Proximal Femoral Compress C     Left Mid Femoral Spont Y     Left Mid Femoral Phasic Y     Left Mid Femoral Augment Y     Left Mid Femoral Competent Y     Left Mid Femoral Compress C     Left Distal Femoral Compress C     Left Popliteal Spont Y     Left Popliteal Phasic Y     Left Popliteal Augment Y     Left Popliteal Competent Y     Left Popliteal Compress C     Left Posterior Tibial Compress C     Left Peroneal Compress C     Left GastronemiusSoleal Compress N     Left GastronemiusSoleal Thrombus A     Left Greater Saph AK Compress C     Left Greater Saph BK Compress C     Left Lesser Saph Compress C      Medications   sodium chloride 0.9 % flush 10 mL (has no administration in time range)   sodium chloride 0.9 % bolus 1,000 mL (1,000 mL Intravenous New Bag 1/21/20 1125)   iopamidol (ISOVUE-370) 76 % injection 100 mL (100 mL Intravenous Given 1/21/20 1230)   Sulfur Hexafluoride Microsph 60.7-25 MG reconstituted suspension 2 mL (2 mL Intravenous Given 1/21/20 1410)     Ct Chest Pulmonary Embolism    Result Date: 1/21/2020  Large central bilateral pulmonary emboli,, involving all lobes of both lungs. The pulmonary emboli appear near occlusive in the proximal lobar branches. Signs of right heart strain. 2. Emergency room physician was notified prior to the time of this dictation.    Electronically Signed By-Dr. Edita Crawford MD On:1/21/2020 1:03 PM This report was finalized on [id number] by Dr. Edita Crawford MD.                                   MDM  The patient has multiple pulmonary emboli involving all lobes.  They are near occlusive in the proximal lobar branches.  There is signs of right heart strain but the echocardiogram did not show any evidence of diastolic  collapse.  The patient also had a positive ultrasound of the left leg for DVT.  The patient will be admitted and started on anticoagulants.  She is hemodynamically stable at this point time.  Final diagnoses:   Multiple subsegmental pulmonary emboli without acute cor pulmonale   Acute deep vein thrombosis (DVT) of other specified vein of left lower extremity (CMS/HCC)            Adrian Ruvalcaba MD  01/21/20 5082

## 2020-01-21 NOTE — PLAN OF CARE
Patient was transferred to the unit shortly after 1600. After visit by Dr. Kearney and lab/imaging review by cardiology, patient will be transferred to [facility].

## 2020-01-22 PROBLEM — I26.99 PULMONARY EMBOLISM (HCC): Status: ACTIVE | Noted: 2020-01-22

## 2020-01-22 LAB
APTT PPP: 128.4 SECONDS (ref 22.7–35.4)
APTT PPP: 48.1 SECONDS (ref 22.7–35.4)
APTT PPP: 51.6 SECONDS (ref 22.7–35.4)
DEPRECATED RDW RBC AUTO: 39.4 FL (ref 37–54)
EOSINOPHIL # BLD MANUAL: 0.24 10*3/MM3 (ref 0–0.4)
EOSINOPHIL NFR BLD MANUAL: 1.6 % (ref 0.3–6.2)
ERYTHROCYTE [DISTWIDTH] IN BLOOD BY AUTOMATED COUNT: 12.4 % (ref 12.3–15.4)
GLUCOSE BLDC GLUCOMTR-MCNC: 113 MG/DL (ref 70–130)
GLUCOSE BLDC GLUCOMTR-MCNC: 115 MG/DL (ref 70–130)
HCT VFR BLD AUTO: 36.2 % (ref 34–46.6)
HGB BLD-MCNC: 12 G/DL (ref 12–15.9)
LYMPHOCYTES # BLD MANUAL: 4.16 10*3/MM3 (ref 0.7–3.1)
LYMPHOCYTES NFR BLD MANUAL: 28.1 % (ref 19.6–45.3)
LYMPHOCYTES NFR BLD MANUAL: 3.9 % (ref 5–12)
MCH RBC QN AUTO: 28.6 PG (ref 26.6–33)
MCHC RBC AUTO-ENTMCNC: 33.1 G/DL (ref 31.5–35.7)
MCV RBC AUTO: 86.4 FL (ref 79–97)
MONOCYTES # BLD AUTO: 0.58 10*3/MM3 (ref 0.1–0.9)
NEUTROPHILS # BLD AUTO: 9.84 10*3/MM3 (ref 1.7–7)
NEUTROPHILS NFR BLD MANUAL: 66.4 % (ref 42.7–76)
NT-PROBNP SERPL-MCNC: 5654 PG/ML (ref 5–450)
PLAT MORPH BLD: NORMAL
PLATELET # BLD AUTO: 282 10*3/MM3 (ref 140–450)
PMV BLD AUTO: 9.3 FL (ref 6–12)
RBC # BLD AUTO: 4.19 10*6/MM3 (ref 3.77–5.28)
RBC MORPH BLD: NORMAL
WBC MORPH BLD: NORMAL
WBC NRBC COR # BLD: 14.82 10*3/MM3 (ref 3.4–10.8)

## 2020-01-22 PROCEDURE — B31T1ZZ FLUOROSCOPY OF LEFT PULMONARY ARTERY USING LOW OSMOLAR CONTRAST: ICD-10-PCS | Performed by: INTERNAL MEDICINE

## 2020-01-22 PROCEDURE — 37185 PRIM ART M-THRMBC SBSQ VSL: CPT | Performed by: INTERNAL MEDICINE

## 2020-01-22 PROCEDURE — 99153 MOD SED SAME PHYS/QHP EA: CPT | Performed by: INTERNAL MEDICINE

## 2020-01-22 PROCEDURE — 85730 THROMBOPLASTIN TIME PARTIAL: CPT | Performed by: INTERNAL MEDICINE

## 2020-01-22 PROCEDURE — 93451 RIGHT HEART CATH: CPT | Performed by: INTERNAL MEDICINE

## 2020-01-22 PROCEDURE — 4A023N6 MEASUREMENT OF CARDIAC SAMPLING AND PRESSURE, RIGHT HEART, PERCUTANEOUS APPROACH: ICD-10-PCS | Performed by: INTERNAL MEDICINE

## 2020-01-22 PROCEDURE — 93010 ELECTROCARDIOGRAM REPORT: CPT | Performed by: INTERNAL MEDICINE

## 2020-01-22 PROCEDURE — 85025 COMPLETE CBC W/AUTO DIFF WBC: CPT | Performed by: INTERNAL MEDICINE

## 2020-01-22 PROCEDURE — 37184 PRIM ART M-THRMBC 1ST VSL: CPT | Performed by: INTERNAL MEDICINE

## 2020-01-22 PROCEDURE — 99152 MOD SED SAME PHYS/QHP 5/>YRS: CPT | Performed by: INTERNAL MEDICINE

## 2020-01-22 PROCEDURE — 85014 HEMATOCRIT: CPT

## 2020-01-22 PROCEDURE — B31S1ZZ FLUOROSCOPY OF RIGHT PULMONARY ARTERY USING LOW OSMOLAR CONTRAST: ICD-10-PCS | Performed by: INTERNAL MEDICINE

## 2020-01-22 PROCEDURE — 25010000002 HEPARIN (PORCINE) PER 1000 UNITS: Performed by: INTERNAL MEDICINE

## 2020-01-22 PROCEDURE — 93568 NJX CAR CTH NSLC P-ART ANGRP: CPT | Performed by: INTERNAL MEDICINE

## 2020-01-22 PROCEDURE — 85007 BL SMEAR W/DIFF WBC COUNT: CPT | Performed by: INTERNAL MEDICINE

## 2020-01-22 PROCEDURE — C1894 INTRO/SHEATH, NON-LASER: HCPCS | Performed by: INTERNAL MEDICINE

## 2020-01-22 PROCEDURE — 82962 GLUCOSE BLOOD TEST: CPT

## 2020-01-22 PROCEDURE — C1769 GUIDE WIRE: HCPCS | Performed by: INTERNAL MEDICINE

## 2020-01-22 PROCEDURE — 25010000002 FENTANYL CITRATE (PF) 100 MCG/2ML SOLUTION: Performed by: INTERNAL MEDICINE

## 2020-01-22 PROCEDURE — 99222 1ST HOSP IP/OBS MODERATE 55: CPT | Performed by: INTERNAL MEDICINE

## 2020-01-22 PROCEDURE — 93005 ELECTROCARDIOGRAM TRACING: CPT | Performed by: INTERNAL MEDICINE

## 2020-01-22 PROCEDURE — C1757 CATH, THROMBECTOMY/EMBOLECT: HCPCS | Performed by: INTERNAL MEDICINE

## 2020-01-22 PROCEDURE — 85018 HEMOGLOBIN: CPT

## 2020-01-22 PROCEDURE — 02CR3ZZ EXTIRPATION OF MATTER FROM LEFT PULMONARY ARTERY, PERCUTANEOUS APPROACH: ICD-10-PCS | Performed by: INTERNAL MEDICINE

## 2020-01-22 PROCEDURE — 25010000002 MIDAZOLAM PER 1 MG: Performed by: INTERNAL MEDICINE

## 2020-01-22 PROCEDURE — C1887 CATHETER, GUIDING: HCPCS | Performed by: INTERNAL MEDICINE

## 2020-01-22 PROCEDURE — 0 IOPAMIDOL PER 1 ML: Performed by: INTERNAL MEDICINE

## 2020-01-22 PROCEDURE — 02CQ3ZZ EXTIRPATION OF MATTER FROM RIGHT PULMONARY ARTERY, PERCUTANEOUS APPROACH: ICD-10-PCS | Performed by: INTERNAL MEDICINE

## 2020-01-22 RX ORDER — ONDANSETRON 2 MG/ML
4 INJECTION INTRAMUSCULAR; INTRAVENOUS EVERY 6 HOURS PRN
Status: DISCONTINUED | OUTPATIENT
Start: 2020-01-22 | End: 2020-01-24 | Stop reason: HOSPADM

## 2020-01-22 RX ORDER — MORPHINE SULFATE 2 MG/ML
1 INJECTION, SOLUTION INTRAMUSCULAR; INTRAVENOUS EVERY 4 HOURS PRN
Status: DISCONTINUED | OUTPATIENT
Start: 2020-01-22 | End: 2020-01-24 | Stop reason: HOSPADM

## 2020-01-22 RX ORDER — ONDANSETRON 4 MG/1
4 TABLET, FILM COATED ORAL EVERY 6 HOURS PRN
Status: DISCONTINUED | OUTPATIENT
Start: 2020-01-22 | End: 2020-01-24 | Stop reason: HOSPADM

## 2020-01-22 RX ORDER — ACETAMINOPHEN 325 MG/1
650 TABLET ORAL EVERY 4 HOURS PRN
Status: DISCONTINUED | OUTPATIENT
Start: 2020-01-22 | End: 2020-01-24 | Stop reason: HOSPADM

## 2020-01-22 RX ORDER — SODIUM CHLORIDE 9 MG/ML
INJECTION, SOLUTION INTRAVENOUS CONTINUOUS PRN
Status: COMPLETED | OUTPATIENT
Start: 2020-01-22 | End: 2020-01-22

## 2020-01-22 RX ORDER — LOPERAMIDE HYDROCHLORIDE 2 MG/1
4 CAPSULE ORAL 4 TIMES DAILY PRN
Status: DISCONTINUED | OUTPATIENT
Start: 2020-01-22 | End: 2020-01-24 | Stop reason: HOSPADM

## 2020-01-22 RX ORDER — MIDAZOLAM HYDROCHLORIDE 1 MG/ML
INJECTION INTRAMUSCULAR; INTRAVENOUS AS NEEDED
Status: DISCONTINUED | OUTPATIENT
Start: 2020-01-22 | End: 2020-01-22 | Stop reason: HOSPADM

## 2020-01-22 RX ORDER — ACETAMINOPHEN 650 MG/1
650 SUPPOSITORY RECTAL EVERY 4 HOURS PRN
Status: DISCONTINUED | OUTPATIENT
Start: 2020-01-22 | End: 2020-01-24 | Stop reason: HOSPADM

## 2020-01-22 RX ORDER — LIDOCAINE HYDROCHLORIDE 20 MG/ML
INJECTION, SOLUTION INFILTRATION; PERINEURAL AS NEEDED
Status: DISCONTINUED | OUTPATIENT
Start: 2020-01-22 | End: 2020-01-22 | Stop reason: HOSPADM

## 2020-01-22 RX ORDER — SODIUM CHLORIDE 0.9 % (FLUSH) 0.9 %
10 SYRINGE (ML) INJECTION EVERY 12 HOURS SCHEDULED
Status: DISCONTINUED | OUTPATIENT
Start: 2020-01-22 | End: 2020-01-24 | Stop reason: HOSPADM

## 2020-01-22 RX ORDER — ACETAMINOPHEN 160 MG/5ML
650 SOLUTION ORAL EVERY 4 HOURS PRN
Status: DISCONTINUED | OUTPATIENT
Start: 2020-01-22 | End: 2020-01-24 | Stop reason: HOSPADM

## 2020-01-22 RX ORDER — PANTOPRAZOLE SODIUM 40 MG/1
40 TABLET, DELAYED RELEASE ORAL
Status: DISCONTINUED | OUTPATIENT
Start: 2020-01-22 | End: 2020-01-24 | Stop reason: HOSPADM

## 2020-01-22 RX ORDER — HEPARIN SODIUM 1000 [USP'U]/ML
INJECTION, SOLUTION INTRAVENOUS; SUBCUTANEOUS AS NEEDED
Status: DISCONTINUED | OUTPATIENT
Start: 2020-01-22 | End: 2020-01-22 | Stop reason: HOSPADM

## 2020-01-22 RX ORDER — SODIUM CHLORIDE 9 MG/ML
75 INJECTION, SOLUTION INTRAVENOUS CONTINUOUS
Status: ACTIVE | OUTPATIENT
Start: 2020-01-22 | End: 2020-01-22

## 2020-01-22 RX ORDER — NALOXONE HCL 0.4 MG/ML
0.4 VIAL (ML) INJECTION
Status: DISCONTINUED | OUTPATIENT
Start: 2020-01-22 | End: 2020-01-24 | Stop reason: HOSPADM

## 2020-01-22 RX ORDER — HYDROCODONE BITARTRATE AND ACETAMINOPHEN 5; 325 MG/1; MG/1
1 TABLET ORAL EVERY 4 HOURS PRN
Status: DISCONTINUED | OUTPATIENT
Start: 2020-01-22 | End: 2020-01-24 | Stop reason: HOSPADM

## 2020-01-22 RX ORDER — FENTANYL CITRATE 50 UG/ML
INJECTION, SOLUTION INTRAMUSCULAR; INTRAVENOUS AS NEEDED
Status: DISCONTINUED | OUTPATIENT
Start: 2020-01-22 | End: 2020-01-22 | Stop reason: HOSPADM

## 2020-01-22 RX ORDER — SODIUM CHLORIDE 0.9 % (FLUSH) 0.9 %
10 SYRINGE (ML) INJECTION AS NEEDED
Status: DISCONTINUED | OUTPATIENT
Start: 2020-01-22 | End: 2020-01-24 | Stop reason: HOSPADM

## 2020-01-22 RX ADMIN — SODIUM CHLORIDE, PRESERVATIVE FREE 10 ML: 5 INJECTION INTRAVENOUS at 08:21

## 2020-01-22 RX ADMIN — LOPERAMIDE HYDROCHLORIDE 4 MG: 2 CAPSULE ORAL at 09:25

## 2020-01-22 RX ADMIN — ESCITALOPRAM 20 MG: 20 TABLET, FILM COATED ORAL at 00:00

## 2020-01-22 RX ADMIN — LOPERAMIDE HYDROCHLORIDE 4 MG: 2 CAPSULE ORAL at 20:47

## 2020-01-22 RX ADMIN — HEPARIN SODIUM 19.6 UNITS/KG/HR: 10000 INJECTION, SOLUTION INTRAVENOUS at 09:10

## 2020-01-22 RX ADMIN — PANTOPRAZOLE SODIUM 40 MG: 40 TABLET, DELAYED RELEASE ORAL at 12:00

## 2020-01-22 RX ADMIN — ACETAMINOPHEN 650 MG: 325 TABLET, FILM COATED ORAL at 23:19

## 2020-01-22 RX ADMIN — SODIUM CHLORIDE, PRESERVATIVE FREE 10 ML: 5 INJECTION INTRAVENOUS at 20:01

## 2020-01-22 RX ADMIN — ESCITALOPRAM 20 MG: 20 TABLET, FILM COATED ORAL at 20:00

## 2020-01-22 RX ADMIN — SODIUM CHLORIDE 75 ML/HR: 9 INJECTION, SOLUTION INTRAVENOUS at 16:18

## 2020-01-22 RX ADMIN — HEPARIN SODIUM 20.6 UNITS/KG/HR: 10000 INJECTION, SOLUTION INTRAVENOUS at 20:35

## 2020-01-22 NOTE — CONSULTS
CONSULT NOTE    Patient Identification:  Merry Melara  44 y.o.  female  1975  [id number]            Requesting physician: Dr Jordi Bazzi    Reason for Consultation:  PE, intensivist care  CC: soa    History of Present Illness:  Patient is a 44-year-old with a previous medical history of morbid obesity and anxiety who presented to the emergency room at [facility] who complained of shortness of breath ongoing fora week worse over past day prior to admission associated with some chest pain pleuritic in nature starting over past day.  She was evaluated with a CT PE protocol which showed bilateral pulmonary emboli a lower extremity duplex that showed a DVT and an echocardiogram does show some RV strain.  Our cardiology interventional team was paged and request the patient transfer for planned intervention.    Dr. Dillon called me tonight to discuss the patient and asked for us to evaluate.  Patient is currently on a heparin drip.  Has no worsening pain no worsening shortness of breath no hematuria.  She is slightly anxious.  She has no increased work of breathing.    She is a former smoker quitting in 2004.  She did recently undergo surgery for a orthopedic indication late Decemeber 2019 about 4 weeks ago and had some immobility related to this.  No prior history of VTE.   Of note she is taking an oral contraceptive that is not on medication list.      Review of Systems:  CONSTITUTIONAL:  Denies fevers or chills  EYE:  No new vision changes  EAR:  No change in hearing  CARDIAC:  + chest pain  PULMONARY:  No productive cough + shortness of breath  GI:  No diarrhea, hematemesis or hematochezia,  RENAL:  No dysuria or urinary frequency  MUSCULOSKELETAL:  No musculoskeletal complaints  ENDOCRINE:  No heat or cold intolerance  INTEGUMENTARY: No skin rashes  NEUROLOGICAL:  No dizziness or confusion.  No seizure activity  PSYCHIATRIC:  No new anxiety or depression  12 system review of systems  performed and all else negative    Past Medical History:   Diagnosis Date   • Anxiety        Past Surgical History:   Procedure Laterality Date   • CHOLECYSTECTOMY     • DILATATION AND CURETTAGE     • TENDON RELEASE     • TONSILLECTOMY          Medications Prior to Admission   Medication Sig Dispense Refill Last Dose   • ALPRAZolam (XANAX) 0.25 MG tablet Take 0.25 mg by mouth 2 (Two) Times a Day As Needed for Anxiety.   2020 at Unknown time   • cetirizine (zyrTEC) 10 MG tablet Take 10 mg by mouth Daily.   2020 at Unknown time   • escitalopram (LEXAPRO) 20 MG tablet Take 20 mg by mouth every night at bedtime.   2020 at Unknown time   • Multiple Vitamins-Minerals (MULTIVITAMIN WITH MINERALS) tablet tablet Take 1 tablet by mouth Daily.   2020 at Unknown time   • omeprazole (priLOSEC) 20 MG capsule Take 20 mg by mouth every night at bedtime.   2020 at Unknown time       No Known Allergies    Social History     Socioeconomic History   • Marital status:      Spouse name: Not on file   • Number of children: Not on file   • Years of education: Not on file   • Highest education level: Not on file   Tobacco Use   • Smoking status: Former Smoker     Last attempt to quit: 2004     Years since quittin.0   Substance and Sexual Activity   • Alcohol use: Never     Frequency: Never     Comment: social   • Drug use: Never   • Sexual activity: Defer       No family history on file.    Physical Exam:  There were no vitals taken for this visit.  There is no height or weight on file to calculate BMI.   General appearance: NAD, conversant   Eyes: anicteric sclerae, moist conjunctivae; no lid-lag; PERRLA  HENT: Atraumatic; oropharynx clear with moist mucous membranes and no mucosal ulcerations; normal hard and soft palate  Neck: Trachea midline; FROM, supple, no thyromegaly or lymphadenopathy  Lungs: CTA, with normal respiratory effort and no intercostal retractions  CV: RRR, no MRGs   Abdomen: Soft,  non-tender; no masses or HSM  Extremities: No peripheral edema or extremity lymphadenopathy  LLE with bandaged area of intervention  Skin: Normal temperature, turgor and texture; no rash, ulcers or subcutaneous nodules  Psych: Appropriate affect, alert and oriented to person, place and time  Neuro: CNs II-XII intact non focal, speech intact, sensation intact    LABS:  Results from last 7 days   Lab Units 01/21/20  1131   WBC 10*3/mm3 15.40*   HEMOGLOBIN g/dL 14.4   PLATELETS 10*3/mm3 392     Results from last 7 days   Lab Units 01/21/20  1131   SODIUM mmol/L 140   POTASSIUM mmol/L 3.9   CHLORIDE mmol/L 101   CO2 mmol/L 23.0   BUN mg/dL 11   CREATININE mg/dL 0.94   GLUCOSE mg/dL 109*   CALCIUM mg/dL 9.9   Estimated Creatinine Clearance: 113.5 mL/min (by C-G formula based on SCr of 0.94 mg/dL).    Imaging: I personally visualized the images of scans/x-rays performed within last 3 days.  Imaging Results (Most Recent)     None      CT chest angio - large bilateral PE   TTE RV strain    Assessment / Recommendations:  Bilateral pulmonary embolism  Lower extremity DVT  RV strain    Agree with CCU monitoring  Cont heparin  Reviewed CT very large proximal clot burden.  Catheter based interventional therapies likely indicated.  Defer timing with interventional cardiology/admitting service.  Currently she is hemodynamically stable and with good oxygenation.  Stop oral contraceptive, suggested that she discuss alternative methods with her PCP/GYN  Monitor in CCU, patient has a life threatening PE and has a high risk of clinical decline requiring close monitoring.  Reviewed imaging, echo, D/w Dr Bazzi, pt family and bedside RN.      CCT 50minutes tonight outside of time spent on billable procedures.    Momo Cortez MD  [facility]  01/21/20  904PM

## 2020-01-22 NOTE — PROGRESS NOTES
LOS: 1 day   Patient Care Team:  Mary Rodgers MD as PCP - General (Internal Medicine)    Subjective     Patient denies any chest pain pressure heaviness and no shortness of breath on 2-1/2 L O2 at rest she says any exertion however she gets short of breath and a little heavy in her chest.  Reviewing with patient she did have surgery on that left leg she was on oral contraceptives and she is overweight 3 identified risk factors for developing DVT.  Patient is anxious to get on with thrombectomy.    Review of Systems:          Objective     Vital Signs  Vital Sign Min/Max for last 24 hours  Temp  Min: 97.5 °F (36.4 °C)  Max: 98.2 °F (36.8 °C)   BP  Min: 104/72  Max: 127/89   Pulse  Min: 77  Max: 111   Resp  Min: 16  Max: 20   SpO2  Min: 90 %  Max: 97 %   Flow (L/min)  Min: 2  Max: 2.5   Weight  Min: 127 kg (279 lb 1.6 oz)  Max: 129 kg (285 lb 0.9 oz)        Ventilator/Non-Invasive Ventilation Settings (From admission, onward)    None                       Body mass index is 38.93 kg/m².  I/O last 3 completed shifts:  In: 200 [I.V.:200]  Out: 100 [Urine:100]  No intake/output data recorded.        Physical Exam:  General Appearance: Well-developed obese white female she is resting comfortably in bed on 2-1/2 L O2 with oxygen saturations of 90 to 91%  Eyes: Conjunctiva are clear and anicteric  ENT: Mucous membranes are moist no erythema or exudates  Neck: A little large trachea is midline I do not appreciate jugular venous distention or hepatojugular reflux.  No palpable lymphadenopathy or thyromegaly  Lungs: Clear no wheezes rales or rhonchi nonlabored symmetric expansion  Cardiac: She is a little tachycardic right around 100 regular rhythm she has what I believe is a split S2.  No murmur  Abdomen: Obese soft no palpable hepatosplenomegaly  : Not examined  Musculoskeletal: She has a Band-Aid on the dorsum of her left foot and over her Achilles area.  She has tenderness and mild swelling in the left  calf  Skin: No jaundice no petechiae skin is warm and dry  Neuro: She is alert oriented cooperative following commands grossly intact  Extremities/P Vascular: No clubbing no cyanosis she has palpable radial and dorsalis pedis pulses bilaterally  MSE: She is a little anxious about upcoming procedure.       Labs:  Results from last 7 days   Lab Units 01/21/20  1131   GLUCOSE mg/dL 109*   SODIUM mmol/L 140   POTASSIUM mmol/L 3.9   CO2 mmol/L 23.0   CHLORIDE mmol/L 101   ANION GAP mmol/L 16.0*   CREATININE mg/dL 0.94   BUN mg/dL 11   BUN / CREAT RATIO  11.7   CALCIUM mg/dL 9.9   EGFR IF NONAFRICN AM mL/min/1.73 65     Estimated Creatinine Clearance: 112.5 mL/min (by C-G formula based on SCr of 0.94 mg/dL).      Results from last 7 days   Lab Units 01/22/20  0440 01/21/20  2129 01/21/20  1131   WBC 10*3/mm3 14.82* 15.18* 15.40*   RBC 10*6/mm3 4.19 4.33 4.76   HEMOGLOBIN g/dL 12.0 12.5 14.4   HEMATOCRIT % 36.2 37.6 42.2   MCV fL 86.4 86.8 88.6   MCH pg 28.6 28.9 30.2   MCHC g/dL 33.1 33.2 34.1   RDW % 12.4 12.0* 12.8   RDW-SD fl 39.4 38.3 39.8   MPV fL 9.3 9.4 7.6   PLATELETS 10*3/mm3 282 300 392   NEUTROPHIL % %  --  51.2 67.5   LYMPHOCYTE % %  --  43.5 26.2   MONOCYTES % %  --  4.4* 5.7   EOSINOPHIL % %  --  0.3 0.2*   BASOPHIL % %  --  0.3 0.4   IMM GRAN % %  --  0.3  --    NEUTROS ABS 10*3/mm3 9.84* 7.78* 10.40*   LYMPHS ABS 10*3/mm3  --  6.61* 4.00*   MONOS ABS 10*3/mm3  --  0.67 0.90   EOS ABS 10*3/mm3 0.24 0.04 0.00   BASOS ABS 10*3/mm3  --  0.04 0.10   IMMATURE GRANS (ABS) 10*3/mm3  --  0.04  --    NRBC /100 WBC  --  0.0 0.1         Results from last 7 days   Lab Units 01/21/20  1751   TROPONIN T ng/mL 0.037*                 Results from last 7 days   Lab Units 01/21/20  2129 01/21/20  1449   INR  1.07 0.97     Microbiology Results (last 10 days)     Procedure Component Value - Date/Time    MRSA Screen, PCR - Swab, Nares [[id number]]  (Normal) Collected:  01/21/20 1800    Lab Status:  Final result Specimen:  Swab  from [facility] Updated:  01/21/20 2152     MRSA PCR No MRSA Detected                escitalopram 20 mg Oral Nightly   sodium chloride 10 mL Intravenous Q12H       heparin (porcine) 11.6 Units/kg/hr Last Rate: 19.6 Units/kg/hr (01/22/20 0546)       Diagnostics:  Ct Chest Pulmonary Embolism    Result Date: 1/21/2020  CT CHEST PULMONARY EMBOLISM-  Date of Exam: 1/21/2020 12:30 PM  Indication: Short of breath with exertion and recent left lower extremity surgery.  . Elevated heart rate.  Comparison: None available.  Technique: Serial and axial CT images of the chest were obtained following the uneventful intravenous administration of 100 cc Isovue-370. contrast. Reconstructions in the coronal and sagittal planes were also performed.  Automated exposure control and iterative reconstruction methods were used.  FINDINGS:  Extensive bilateral pulmonary emboli are present. There is a large embolism within the right main pulmonary artery, and there is near occlusive embolism within the proximal right upper, right middle and right lower lobe pulmonary arteries extending into the subsegmental branches. There is embolic disease in the distal left main pulmonary artery, with near occlusive thrombus within the left upper and left lower lobe pulmonary arteries, extending into the subsegmental branches.  There is concavity of the interventricular septum the heart suggesting right heart strain. Heart size is normal. No pericardial effusion.  No acute airspace disease. Benign calcified granuloma within the right lower lobe.    Cholecystectomy. Small second duodenal segment diverticulum. Remainder of included upper abdominal organs are within normal limits.  No acute osseous abnormality.       Large central bilateral pulmonary emboli,, involving all lobes of both lungs. The pulmonary emboli appear near occlusive in the proximal lobar branches. Signs of right heart strain. 2. Emergency room physician was notified prior to the time of this  dictation.    Electronically Signed By-Dr. Edita Crawford MD On:1/21/2020 1:03 PM This report was finalized on [id number] by Dr. Edita Crawford MD.    Results for orders placed during the hospital encounter of 01/21/20   ADULT TRANSTHORACIC ECHO COMPLETE W/ CONT IF NECESSARY PER PROTOCOL    Narrative · Left ventricular wall thickness is consistent with mild concentric   hypertrophy.  · Estimated EF = 55%.  · Left ventricular systolic function is normal.  · Mild tricuspid valve regurgitation is present.  · Left ventricular diastolic dysfunction (grade I) consistent with   impaired relaxation.  · Left atrial cavity size is mildly dilated.  · Right ventricular cavity is moderate-to-severely dilated.  · Moderately reduced right ventricular systolic function noted.  · RV contractile morphology consistent with positive Paredes sign          EKG shows sinus rhythm with some anterior T inversion    Active Hospital Problems    Diagnosis  POA   • Pulmonary embolism (CMS/HCC) [I26.99]  Yes      Resolved Hospital Problems   No resolved problems to display.         Assessment/Plan     1. Bilateral pulmonary emboli with evidence of RV strain and moderate to severe RV dilation on echocardiogram with elevated proBNP and troponin.  Subacute cor pulmonale this with hypoxia certainly puts her at higher risk.  Patient for thrombectomy/catheter directed thrombolytic per cardiology.  2. Left lower extremity DVT in the gastrocnemius/soleus vein provoked clot with the surgery on that left lower extremity oral contraceptives and obesity.  She probably should try and avoid oral contraceptives in the future and find another method of contraception.  She can talk with her OB about this.  I have talked to her about some precautions.  She is going to need anticoagulation probably with the extent of her pulmonary emboli 6 months   3. acute hypoxic respiratory failure she is requiring 2-1/2 L O2 to keep her saturations around 90 to 91% I  think this is related to her pulmonary emboli and should improve with treatment.  4. Obesity weight loss would be beneficial  5. Recent 12/26/2019 left lower extremity surgery apparently they removed necrotic bone and cartilage that occurred as a result of a stress fracture she also had a tendon release    Plan for disposition:    Jairo Ag MD  01/22/20  8:15 AM    Time: Spent about 36 minutes on patient's care today

## 2020-01-22 NOTE — PLAN OF CARE
Problem: Patient Care Overview  Goal: Plan of Care Review  Outcome: Ongoing (interventions implemented as appropriate)  Flowsheets (Taken 1/22/2020 0518)  Progress: no change  Plan of Care Reviewed With: patient; spouse; family; friend  Outcome Summary: Pt admitted last night from [place] @2030. Pt on RA, denies SOB or CP when resting but states that SOB is extremely severe when walking around or doing any activity. Pt AAOx4, able to turn without assistance. Pt O2 started to drop to 87% on RA, O2 via NC placed and titrated to 2.5L to obtain 90% O2. Heparin gtt started on protocol without bolus and increased by 4u per protocol according to PM PTT lab draw. Currently infusing at 15.3u/kg/hr. Pending AM PTT and gtt will be changed according to protocol. Pt was educated on signs and symptoms of worsening PE and to call nurse if SOB or chest got worse or started suddenly. Pt had a liquid BM, states this has been going on for several days. Pt voided clear yellow urine without difficulty. Pt tearful and upset about being on bedrest and hospitalization but pleasant and compliant with all treatment plans. Pt had multiple family and friends visit when admitted and to be expected to come in AM as well before any procedures. SR RN

## 2020-01-22 NOTE — PROGRESS NOTES
Clinical Pharmacy Services: Medication History    Merry Melara is a 44 y.o. female presenting to [facility] for Pulmonary embolism (CMS/HCC) [I26.99]    She  has a past medical history of Anxiety.    Allergies as of 01/21/2020   • (No Known Allergies)       Medication information was obtained from: patient  Pharmacy and Phone Number:     Prior to Admission Medications     Prescriptions Last Dose Informant Patient Reported? Taking?    ALPRAZolam (XANAX) 0.25 MG tablet Past Week  Yes Yes    Take 0.25 mg by mouth 2 (Two) Times a Day As Needed for Anxiety.    cetirizine (zyrTEC) 10 MG tablet 1/20/2020  Yes Yes    Take 10 mg by mouth Daily.    escitalopram (LEXAPRO) 20 MG tablet 1/21/2020  Yes Yes    Take 20 mg by mouth every night at bedtime.    Multiple Vitamins-Minerals (MULTIVITAMIN WITH MINERALS) tablet tablet 1/20/2020  Yes Yes    Take 1 tablet by mouth Daily.    omeprazole (priLOSEC) 20 MG capsule 1/20/2020  Yes Yes    Take 20 mg by mouth every night at bedtime.        Medication notes:   Note patient also took an oral contraceptive prior to admission. She has already been instructed by physicians to stop taking this due to her new clot. I have not added to medication list.    This medication list is complete to the best of my knowledge as of 1/22/2020    Please call if questions.    Tash Paez RPH  1/22/2020 3:54 PM

## 2020-01-22 NOTE — PLAN OF CARE
Pt remains in CCU. Thrombectomy done today with Scott. Pt on room air. No complaints of pain. No complaints of SOB. Diet ordered. Site soft and no hematoma. Heparin gtt remains on. Fluids going at 75cc/hour. Continue to monitor per orders.

## 2020-01-22 NOTE — H&P
[facility] History and Physical    Patient Name: Merry Melara  :1975  44 y.o.    Date of Admission: 2020  Date of Consultation:  20  Encounter Provider: Joanie Chavez MD  Place of Service: [facility] CARDIOLOGY  Referring Provider: Corey Bazzi MD  Patient Care Team:  Mary Rodgers MD as PCP - General (Internal Medicine)      Chief complaint: Pulmonary Embolism    History of Present Illness:    Ms Melara is a 44 year old patient with a history of anxiety who is admitted with bilateral pulmonary embolism.     The patient presented to [facility] yesterday with worsening dyspnea on exertion starting last week. This was associated with chest tightness.  She initially thought the symptoms were due to anxiety but it did not improve with use of alprazolam.  The day prior to her presentation she noted that she could not even go from her couch to her bathroom without getting severely short of breath.    She had recent left lower extremity surgery on 2019 to remove necrotic bone and cartilage and release a tendon.  She reports she only decrease her activity for a few days and was up moving around normally a week following her surgery.  She reports that her family even complained she was doing too much too quickly. She reports left lower extremity swelling but attributed this to her surgery and that it was improving as expected.     Following her arrival to the [facility] emergency room a CT angiogram of the chest showed evidence of bilateral pulmonary embolism with a large amount of proximal thrombus and evidence of right heart strain.  She additionally underwent an echocardiogram that showed evidence of right ventricular enlargement with decreased RV function.  Lower extremity venous doppler showed evidence of acute left calf DVT.  She was started on a heparin gtt and transferred to [place] for further management.     She denies any  long car or plane trips.  She denies any family history of clotting issues.  She denies any personal history of bleeding issues.  She is on an oral contraceptive.      Previous Cardiac Testing  ECHO 01/21/2020  · Left ventricular wall thickness is consistent with mild concentric hypertrophy.  · Estimated EF = 55%.  · Left ventricular systolic function is normal.  · Mild tricuspid valve regurgitation is present.  · Left ventricular diastolic dysfunction (grade I) consistent with impaired relaxation.  · Left atrial cavity size is mildly dilated.  · Right ventricular cavity is moderate-to-severely dilated.  · Moderately reduced right ventricular systolic function noted.  · RV contractile morphology consistent with positive Paredes sign           Past Medical History:   Diagnosis Date   • Anxiety        Past Surgical History:   Procedure Laterality Date   • CHOLECYSTECTOMY     • DILATATION AND CURETTAGE     • TENDON RELEASE     • TONSILLECTOMY           Prior to Admission medications    Medication Sig Start Date End Date Taking? Authorizing Provider   ALPRAZolam (XANAX) 0.25 MG tablet Take 0.25 mg by mouth 2 (Two) Times a Day As Needed for Anxiety.   Yes Dee Barnes MD   cetirizine (zyrTEC) 10 MG tablet Take 10 mg by mouth Daily.   Yes Dee Barnes MD   escitalopram (LEXAPRO) 20 MG tablet Take 20 mg by mouth every night at bedtime.   Yes Dee Barnes MD   Multiple Vitamins-Minerals (MULTIVITAMIN WITH MINERALS) tablet tablet Take 1 tablet by mouth Daily.   Yes Dee Barnes MD   omeprazole (priLOSEC) 20 MG capsule Take 20 mg by mouth every night at bedtime.   Yes Dee Barnes MD       No Known Allergies    Social History     Socioeconomic History   • Marital status:      Spouse name: Not on file   • Number of children: Not on file   • Years of education: Not on file   • Highest education level: Not on file   Tobacco Use   • Smoking status: Former Smoker     Last  attempt to quit: 2004     Years since quittin.0   Substance and Sexual Activity   • Alcohol use: Never     Frequency: Never     Comment: social   • Drug use: Never   • Sexual activity: Defer       No family history on file.    REVIEW OF SYSTEMS:   All systems reviewed.  Pertinent positives identified in HPI.  All other systems are negative.      Objective:     Vitals:    20 0558 20 0600 20 0630 20 0700   BP:  108/71 113/79 124/91   Pulse:  82 77 98   Temp:       TempSrc:       SpO2:  92% 93% 91%   Weight: 127 kg (279 lb 1.6 oz)        Body mass index is 38.93 kg/m².    General Appearance:    Alert, cooperative, in no acute distress   Head:    Normocephalic, without obvious abnormality, atraumatic   Eyes:            Lids and lashes normal, conjunctivae and sclerae normal, no icterus, no pallor, corneas clear, PERRLA   Ears:    Ears appear intact with no abnormalities noted   Neck:   No adenopathy, supple, trachea midline, no thyromegaly, no carotid bruit, no JVD   Lungs:     Clear to auscultation, respirations regular, even and unlabored    Heart:    Regular rhythm and normal rate, normal S1 and S2, no murmur, no gallop, no rub, no click   Chest Wall:    No abnormalities observed   Abdomen:     Normal bowel sounds, no masses, no organomegaly, soft, nontender, nondistended, no guarding, no rebound  tenderness   Extremities:   Moves all extremities well, no edema, no cyanosis, no redness   Pulses:   Pulses palpable and equal bilaterally. Normal radial, carotid, femoral, dorsalis pedis and posterior tibial pulses bilaterally. Normal abdominal aorta   Skin:  Psychiatric:   No bleeding, bruising or rash    Alert and oriented x 3, normal mood and affect   Lab Review:     Results from last 7 days   Lab Units 20  1131   SODIUM mmol/L 140   POTASSIUM mmol/L 3.9   CHLORIDE mmol/L 101   CO2 mmol/L 23.0   BUN mg/dL 11   CREATININE mg/dL 0.94   CALCIUM mg/dL 9.9   GLUCOSE mg/dL 109*     Results  from last 7 days   Lab Units 01/21/20  1751   TROPONIN T ng/mL 0.037*     Results from last 7 days   Lab Units 01/22/20  0440   WBC 10*3/mm3 14.82*   HEMOGLOBIN g/dL 12.0   HEMATOCRIT % 36.2   PLATELETS 10*3/mm3 282     Results from last 7 days   Lab Units 01/22/20  0440 01/21/20  2129 01/21/20  1449   INR   --  1.07 0.97   APTT seconds 48.1* 36.7* 16.7*                     CT of chest      IMPRESSION:  Large central bilateral pulmonary emboli,, involving all lobes of both  lungs. The pulmonary emboli appear near occlusive in the proximal lobar  branches. Signs of right heart strain.  2. Emergency room physician was notified prior to the time of this  dictation.    EKG this AM    EKG on arrival      I personally viewed and interpreted the patient's EKG/Telemetry data.        Assessment and Plan:       1. Bilateral pulmonary embolism. On review of CT she appears to have a large amount of proximal thrombus bilaterally.  CT, echo and abnormal troponin are consistent with RV strain.  On heparin gtt.  This is likely provoked from recent surgery along with chronic oral contraceptive use.   2. Acute left lower extremity DVT.    3. Acute hypoxic respiratory failure.  Requiring 2.5 liters nasal cannula.   4. Right ventricular enlargement/dysfunction  5. Status post left lower extremity surgery on 12/26/2019  6. Chronic oral contraceptive use  7. Anxiety    -  Catheter directed intervention is indicated and I recommended proceeding with thrombectomy today.  I explained the reasoning for my recommendation, the procedure, risks and benefits at length with the patient and her family.  She agrees to proceed.   -  Continue heparin gtt.     Joanie Chavez MD  01/22/20  7:45 AM

## 2020-01-22 NOTE — PROGRESS NOTES
Case Management Discharge Note      Final Note: [facility]           Final Discharge Disposition Code: 02 - short term hospital for  care

## 2020-01-22 NOTE — PAYOR COMM NOTE
"RE :  REF#  [id number]    REQUEST FOR INPATIENT PRECERT    UTILIZATION REVIEW  RETURN CONTACT:  SHANNON BUCKNER RN [facility]  PH: [phone]  FAX: [phone]  [facility]  NPI# [id number]  TAX ID # [id number]  -------------------------------    MILLIMAN:   Pulmonary Embolism (M-290)  Admission is indicated for 1 or more of the following(1)(2)(3)(4)(5)(6)(7):  Right ventricular dysfunction or strain (eg, by echocardiogram, ECG)(8)(10)  Patient at high risk for acute complications (eg, hemorrhage) of anticoagulation as indicated by 1 or more of the following:  Recent surgery (eg, within 3 months) that increases risk of catastrophic bleeding (eg, spinal surgery, intracranial surgery, cardiovascular surgery)      KeltonMerry austin (44 y.o. Female)     Date of Birth Social Security Number Address Home Phone MRN    1975  [address] [place] [address] [address] [place] [address]  [id number]    Jainism Marital Status          Cheondoism        Admission Date Admission Type Admitting Provider Attending Provider Department, Room/Bed    1/21/20 Emergency Jenni Kearney MD  [facility] INTENSIVE CARE UNIT, 2304/1    Discharge Date Discharge Disposition Discharge Destination        1/21/2020 [facility]              Attending Provider:  (none)   Allergies:  No Known Allergies    Isolation:  None   Infection:  None   Code Status:  CPR    Ht:  180.3 cm (71\")   Wt:  129 kg (285 lb 0.9 oz)    Admission Cmt:  None   Principal Problem:  None                Active Insurance as of 1/21/2020     Primary Coverage     Payor Plan Insurance Group Employer/Plan Group    ANTHEM BLUE CROSS ANTHEM BLUE CROSS BLUE SHIELD PPO [id number]     Payor Plan Address Payor Plan Phone Number Payor Plan Fax Number Effective Dates    [address] [phone]  8/1/2011 - None Entered    [facility] [id number]       Subscriber Name Subscriber Birth Date Member ID       NIKIA ALEXIS  [id number]                 Emergency " Contacts      (Rel.) Home Phone Work Phone Mobile Phone    vic lomeli (Spouse) -- -- [phone]               History & Physical      Jenni Kearney MD at 20 1459          Pulmonary/ Critical Care/ sleep medicine ADMISSION H&P Note        Patient Name:  Merry Lomeli    :  1975    Medical Record:  [id number]    Primary Care Physician     Mary Rodgers MD    [facility]  Merry Lomeli is a 44 y.o. female with a PMH sig for anxiety presented to the ED with complaints of worsening shortness of air with activity x 2 days.  Shortness of air is better at rest.  She reported some chest discomfort with deep breathing.  She reported no increase in swelling or pain to her left lower extremity.  Of note, she underwent a surgical procedure on 19 to remove some necrotic bone and cartilage that occurred as a result of a stress fracture.  She also had a tendon release in the same extremity.  CT Chest PE was completed and showed positive for bilateral pulmonary emboli involving all lungs.  A lower extremity venous bilateral doppler study was positive for a left lower extremity DVT.  ECHO completed and report pending. She will be admitted and started on a heparin gtt.      Review of Systems    Constitutional:  Denies fever or chills   Eyes:  Denies change in visual acuity   HENT:  Denies nasal congestion or sore throat   Respiratory:  Denies cough + shortness of breath   Cardiovascular:  + chest pain no edema   GI:  Denies abdominal pain, nausea, vomiting, bloody stools or diarrhea   :  Denies dysuria   Musculoskeletal:  Denies back pain or joint pain   Integument:  Denies rash   Neurologic:  Denies headache, focal weakness or sensory changes   Endocrine:  Denies polyuria or polydipsia   Lymphatic:  Denies swollen glands   Psychiatric:  Denies depression + anxiety       Medical History    Past Medical History:   Diagnosis Date   • Anxiety         Surgical History    Past Surgical History:      Procedure Laterality Date   • CHOLECYSTECTOMY     • DILATATION AND CURETTAGE     • TENDON RELEASE     • TONSILLECTOMY          Family History    History reviewed. No pertinent family history.    Social History    Social History     Tobacco Use   • Smoking status: Former Smoker     Last attempt to quit: 2004     Years since quittin.0   Substance Use Topics   • Alcohol use: Never     Frequency: Never     Comment: social        Allergies    No Known Allergies    Medications    Scheduled Meds:  cetirizine 10 mg Oral Daily   escitalopram 20 mg Oral Daily   heparin 10,000 Units Intravenous Once   [START ON 2020] pantoprazole 40 mg Oral QAM   sodium chloride 10 mL Intravenous Q12H   THERA 1 tablet Oral Daily     Continuous Infusions:  heparin 11.6 Units/kg/hr     PRN Meds:.•  ALPRAZolam  •  heparin  •  heparin  •  [COMPLETED] Insert peripheral IV **AND** sodium chloride  •  sodium chloride      Physical Exam    tMax 24 hrs:  Temp (24hrs), Av.6 °F (36.4 °C), Min:97.6 °F (36.4 °C), Max:97.6 °F (36.4 °C)    Vitals Ranges:  Temp:  [97.6 °F (36.4 °C)] 97.6 °F (36.4 °C)  Heart Rate:  [100-111] 100  Resp:  [20] 20  BP: (104-112)/(65-72) 104/72  Intake and Output Last 3 Shifts:  No intake/output data recorded.    Constitutional:  Well developed, well nourished, no acute distress, non-toxic appearance   Eyes:  PERRL, conjunctiva normal   HENT:  Atraumatic, external ears normal, nose normal, oropharynx moist, no pharyngeal exudates. Neck- normal range of motion, no tenderness, supple   Respiratory:  No respiratory distress, normal breath sounds, no rales, no wheezing   Cardiovascular:  Normal rate, normal rhythm, no murmurs, no gallops, no rubs   GI:  Soft, nondistended, normal bowel sounds, nontender, no organomegaly, no mass, no rebound, no guarding   :  No costovertebral angle tenderness   Musculoskeletal:  No edema, no tenderness, no deformities.  Left foot in a post surgical boot  Integument:  Well hydrated,  no rash   Lymphatic:  No lymphadenopathy noted   Neurologic:  Alert & oriented x 3, CN 2-12 normal, normal motor function, normal sensory function, no focal deficits noted   Psychiatric:  Speech and behavior appropriate     labs    Lab Results (last 24 hours)     Procedure Component Value Units Date/Time    Protime-INR [[id number]] Collected:  01/21/20 1449    Specimen:  Blood from Arm, Right Updated:  01/21/20 1454    aPTT [[id number]] Collected:  01/21/20 1449    Specimen:  Blood from Arm, Right Updated:  01/21/20 1454    Extra Tubes [[id number]] Collected:  01/21/20 1450    Specimen:  Blood from Arm, Left Updated:  01/21/20 1454    Narrative:       The following orders were created for panel order Extra Tubes.  Procedure                               Abnormality         Status                     ---------                               -----------         ------                     Gold Top - SST[[id number]]                                   In process                   Please view results for these tests on the individual orders.    Gold Top - SST [[id number]] Collected:  01/21/20 1450    Specimen:  Blood from Arm, Left Updated:  01/21/20 1454    Basic Metabolic Panel [[id number]]  (Abnormal) Collected:  01/21/20 1131    Specimen:  Blood Updated:  01/21/20 1157     Glucose 109 mg/dL      BUN 11 mg/dL      Creatinine 0.94 mg/dL      Sodium 140 mmol/L      Potassium 3.9 mmol/L      Chloride 101 mmol/L      CO2 23.0 mmol/L      Calcium 9.9 mg/dL      eGFR Non African Amer 65 mL/min/1.73      BUN/Creatinine Ratio 11.7     Anion Gap 16.0 mmol/L     Narrative:       GFR Normal >60  Chronic Kidney Disease <60  Kidney Failure <15      CBC & Differential [[id number]] Collected:  01/21/20 1131    Specimen:  Blood Updated:  01/21/20 1135    Narrative:       The following orders were created for panel order CBC & Differential.  Procedure                               Abnormality         Status                     ---------                                -----------         ------                     CBC Auto Differential[[id number]]        Abnormal            Final result                 Please view results for these tests on the individual orders.    CBC Auto Differential [[id number]]  (Abnormal) Collected:  01/21/20 1131    Specimen:  Blood Updated:  01/21/20 1135     WBC 15.40 10*3/mm3      RBC 4.76 10*6/mm3      Hemoglobin 14.4 g/dL      Hematocrit 42.2 %      MCV 88.6 fL      MCH 30.2 pg      MCHC 34.1 g/dL      RDW 12.8 %      RDW-SD 39.8 fl      MPV 7.6 fL      Platelets 392 10*3/mm3      Neutrophil % 67.5 %      Lymphocyte % 26.2 %      Monocyte % 5.7 %      Eosinophil % 0.2 %      Basophil % 0.4 %      Neutrophils, Absolute 10.40 10*3/mm3      Lymphocytes, Absolute 4.00 10*3/mm3      Monocytes, Absolute 0.90 10*3/mm3      Eosinophils, Absolute 0.00 10*3/mm3      Basophils, Absolute 0.10 10*3/mm3      nRBC 0.1 /100 WBC           Imaging & Other Studies    Imaging Results (Last 72 Hours)     Procedure Component Value Units Date/Time    CT Chest Pulmonary Embolism [[id number]] Collected:  01/21/20 1259     Updated:  01/21/20 1305    Narrative:       CT CHEST PULMONARY EMBOLISM-     Date of Exam: 1/21/2020 12:30 PM     Indication: Short of breath with exertion and recent left lower  extremity surgery.  . Elevated heart rate.     Comparison: None available.     Technique: Serial and axial CT images of the chest were obtained  following the uneventful intravenous administration of 100 cc  Isovue-370. contrast. Reconstructions in the coronal and sagittal planes  were also performed.  Automated exposure control and iterative  reconstruction methods were used.     FINDINGS:     Extensive bilateral pulmonary emboli are present. There is a large  embolism within the right main pulmonary artery, and there is near  occlusive embolism within the proximal right upper, right middle and  right lower lobe pulmonary arteries extending into the  subsegmental  branches. There is embolic disease in the distal left main pulmonary  artery, with near occlusive thrombus within the left upper and left  lower lobe pulmonary arteries, extending into the subsegmental branches.     There is concavity of the interventricular septum the heart suggesting  right heart strain. Heart size is normal. No pericardial effusion.     No acute airspace disease. Benign calcified granuloma within the right  lower lobe.           Cholecystectomy. Small second duodenal segment diverticulum. Remainder  of included upper abdominal organs are within normal limits.     No acute osseous abnormality.          Impression:       Large central bilateral pulmonary emboli,, involving all lobes of both  lungs. The pulmonary emboli appear near occlusive in the proximal lobar  branches. Signs of right heart strain.  2. Emergency room physician was notified prior to the time of this  dictation.           Electronically Signed By-Dr. Edita Crawford MD On:1/21/2020 1:03 PM  This report was finalized on [id number] by Dr. Edita Crawford MD.          Assessment    Acute submassive Bilateral pulmonary emboli   -s/p recent left lower extremity surgery on 12/26/20, left foot in post op boot  -CT PE study reviewed.  Positive for bilateral PE involving all lungs  -start heparin gtt, will transition to PO at discharge.  Will need at least 6 month of anti-coagulant treatment      Dyspnea on exertion  -secondary to above  -6 minute walk prior to discharge  -can use supplemental oxygen as needed to maintain sats 92%, on RA  -ECHO final report pending    Acute deep vein thrombosis (DVT) of left lower extremity (CMS/HCC)  -venous doppler reviewed     Anxiety disorder  -resume home medication      DVT ppy:  -heparin gtt     PPI ppy:  -protonix (home med)    Full Code    Attending physician statement:  Patient is critically ill.  Total critical care time spent is 32 minutes which does not include any time for  procedures.  Critical care time is exclusive of time spent by the nurse practitioner.  Above note scribed by nurse practitioner for me and later reviewed by me for accuracy . I've examined the patient and reviewed all labs and images.    D/w Dr Vidal and reviewed Echo. She does have RV strain on echo with RV dilation and dysfunction. Given her significant clot burden, she will benefit from EKOS or clot extraction procedure. Will transfer the patient to [facility]. Spoke with Dr Bazzi.    I have directly participated in the evaluation and management of this patient.  Jenni Kearney MD      Electronically signed by Jenni Kearney MD at 01/21/20 1648          Emergency Department Notes      Adrian Ruvalcaba MD at 01/21/20 1108          Subjective   History of Present Illness  44-year-old female with a history of anxiety disorder but no history of any lung or heart disease states she is 1 month post tendon release involving the Achilles tendon as well as removal of some avascular necrotic bone in the foot.  The patient has been in an immobilizer type of brace involving the left lower extremity.  She states that over the past 4 days she has had increasing shortness of breath with exertion.  She denies any cough congestion fever chills hemoptysis or chest pain.  The patient denies any sore throat.  There is been no nausea or vomiting.  She has noticed no swelling in the extremities.  Review of Systems    Past Medical History:   Diagnosis Date   • Anxiety        No Known Allergies    Past Surgical History:   Procedure Laterality Date   • CHOLECYSTECTOMY     • DILATATION AND CURETTAGE     • TENDON RELEASE     • TONSILLECTOMY         History reviewed. No pertinent family history.    Social History     Socioeconomic History   • Marital status:      Spouse name: Not on file   • Number of children: Not on file   • Years of education: Not on file   • Highest education level: Not on file   Tobacco Use   •  Smoking status: Former Smoker     Last attempt to quit: 2004     Years since quittin.0   Substance and Sexual Activity   • Alcohol use: Never     Frequency: Never     Comment: social   • Drug use: Never   • Sexual activity: Defer           Objective   Physical Exam  Patient is awake and alert she was afebrile vital signs are stable her O2 sat was 94% on room air the HEENT exam is unremarkable neck is supple her chest is clear cardiovascular exam reveals a regular rhythm without a gallop or murmur the abdomen is soft and nontender the patient has a cam walker on the left lower extremity and wounds that are healing well.  There was no edema or point tenderness over the calf or along the medial aspect of the thigh.  Procedures          ED Course                Results for orders placed or performed during the hospital encounter of 20   Basic Metabolic Panel   Result Value Ref Range    Glucose 109 (H) 65 - 99 mg/dL    BUN 11 6 - 20 mg/dL    Creatinine 0.94 0.57 - 1.00 mg/dL    Sodium 140 136 - 145 mmol/L    Potassium 3.9 3.5 - 5.2 mmol/L    Chloride 101 98 - 107 mmol/L    CO2 23.0 22.0 - 29.0 mmol/L    Calcium 9.9 8.6 - 10.5 mg/dL    eGFR Non African Amer 65 >60 mL/min/1.73    BUN/Creatinine Ratio 11.7 7.0 - 25.0    Anion Gap 16.0 (H) 5.0 - 15.0 mmol/L   CBC Auto Differential   Result Value Ref Range    WBC 15.40 (H) 3.40 - 10.80 10*3/mm3    RBC 4.76 3.77 - 5.28 10*6/mm3    Hemoglobin 14.4 12.0 - 15.9 g/dL    Hematocrit 42.2 34.0 - 46.6 %    MCV 88.6 79.0 - 97.0 fL    MCH 30.2 26.6 - 33.0 pg    MCHC 34.1 31.5 - 35.7 g/dL    RDW 12.8 12.3 - 15.4 %    RDW-SD 39.8 37.0 - 54.0 fl    MPV 7.6 6.0 - 12.0 fL    Platelets 392 140 - 450 10*3/mm3    Neutrophil % 67.5 42.7 - 76.0 %    Lymphocyte % 26.2 19.6 - 45.3 %    Monocyte % 5.7 5.0 - 12.0 %    Eosinophil % 0.2 (L) 0.3 - 6.2 %    Basophil % 0.4 0.0 - 1.5 %    Neutrophils, Absolute 10.40 (H) 1.70 - 7.00 10*3/mm3    Lymphocytes, Absolute 4.00 (H) 0.70 - 3.10 10*3/mm3     Monocytes, Absolute 0.90 0.10 - 0.90 10*3/mm3    Eosinophils, Absolute 0.00 0.00 - 0.40 10*3/mm3    Basophils, Absolute 0.10 0.00 - 0.20 10*3/mm3    nRBC 0.1 0.0 - 0.2 /100 WBC   ADULT TRANSTHORACIC ECHO COMPLETE W/ CONT IF NECESSARY PER PROTOCOL   Result Value Ref Range    Target HR (85%) 150 bpm    Max. Pred. HR (100%) 176 bpm   Duplex Venous Lower Extremity - Left   Result Value Ref Range    Left GastronemiusSoleal Vessel 1     Right Common Femoral Spont Y     Right Common Femoral Phasic N     Right Common Femoral Augment Y     Right Common Femoral Competent Y     Right Common Femoral Compress C     Left Common Femoral Spont Y     Left Common Femoral Phasic N     Left Common Femoral Augment Y     Left Common Femoral Competent Y     Left Common Femoral Compress C     Left Saphenofemoral Junction Spont Y     Left Saphenofemoral Junction Phasic N     Left Saphenofemoral Junction Augment Y     Left Saphenofemoral Junction Competent Y     Left Saphenofemoral Junction Compress C     Left Proximal Femoral Compress C     Left Mid Femoral Spont Y     Left Mid Femoral Phasic Y     Left Mid Femoral Augment Y     Left Mid Femoral Competent Y     Left Mid Femoral Compress C     Left Distal Femoral Compress C     Left Popliteal Spont Y     Left Popliteal Phasic Y     Left Popliteal Augment Y     Left Popliteal Competent Y     Left Popliteal Compress C     Left Posterior Tibial Compress C     Left Peroneal Compress C     Left GastronemiusSoleal Compress N     Left GastronemiusSoleal Thrombus A     Left Greater Saph AK Compress C     Left Greater Saph BK Compress C     Left Lesser Saph Compress C      Medications   sodium chloride 0.9 % flush 10 mL (has no administration in time range)   sodium chloride 0.9 % bolus 1,000 mL (1,000 mL Intravenous New Bag 1/21/20 1125)   iopamidol (ISOVUE-370) 76 % injection 100 mL (100 mL Intravenous Given 1/21/20 1230)   Sulfur Hexafluoride Microsph 60.7-25 MG reconstituted suspension 2 mL (2  mL Intravenous Given 1/21/20 1410)     Ct Chest Pulmonary Embolism    Result Date: 1/21/2020  Large central bilateral pulmonary emboli,, involving all lobes of both lungs. The pulmonary emboli appear near occlusive in the proximal lobar branches. Signs of right heart strain. 2. Emergency room physician was notified prior to the time of this dictation.    Electronically Signed By-Dr. Edita Crawford MD On:1/21/2020 1:03 PM This report was finalized on [id number] by Dr. Edita Crawford MD.                                   MDM  The patient has multiple pulmonary emboli involving all lobes.  They are near occlusive in the proximal lobar branches.  There is signs of right heart strain but the echocardiogram did not show any evidence of diastolic collapse.  The patient also had a positive ultrasound of the left leg for DVT.  The patient will be admitted and started on anticoagulants.  She is hemodynamically stable at this point time.  Final diagnoses:   Multiple subsegmental pulmonary emboli without acute cor pulmonale   Acute deep vein thrombosis (DVT) of other specified vein of left lower extremity (CMS/HCC)            Adrian Ruvalcaba MD  01/21/20 1425      Electronically signed by Adrian Ruvalcaba MD at 01/21/20 1425     Gayle Lee at 01/21/20 1213        Tech transport requested to CT rm 2     Gayle Lee  01/21/20 1214      Electronically signed by Gayle Lee at 01/21/20 1214       Physician Progress Notes (last 24 hours) (Notes from 01/21/20 1012 through 01/22/20 1012)    No notes of this type exist for this encounter.         Consult Notes (last 24 hours) (Notes from 01/21/20 1012 through 01/22/20 1012)    No notes of this type exist for this encounter.            Discharge Summary      Tash Jacobo, MALLORY at 01/21/20 1648          Pulmonary/ Critical Care/ sleep medicine discharge summary Note    Date of Discharge:  1/21/2020    Discharge Diagnosis: Acute bilateral pulmonary  emboli, acute left lower extremity DVT    Presenting Problem/History of Present Illness  Active Hospital Problems    Diagnosis  POA   • Multiple subsegmental pulmonary emboli without acute cor pulmonale [I26.94]  Yes   • Dyspnea on exertion [R06.09]  Yes   • Acute deep vein thrombosis (DVT) of left lower extremity (CMS/HCC) [I82.402]  Yes   • Anxiety disorder [F41.9]  Yes      Resolved Hospital Problems   No resolved problems to display.          Hospital Course  Admit: 1/21/20    Per H&P:  Merry Melara is a 44 y.o. female with a PMH sig for anxiety presented to the ED with complaints of worsening shortness of air with activity x 2 days.  Shortness of air is better at rest.  She reported some chest discomfort with deep breathing.  She reported no increase in swelling or pain to her left lower extremity.  Of note, she underwent a surgical procedure on 12/26/19 to remove some necrotic bone and cartilage that occurred as a result of a stress fracture.  She also had a tendon release in the same extremity.  CT Chest PE was completed and showed positive for bilateral pulmonary emboli involving all lungs.  A lower extremity venous bilateral doppler study was positive for a left lower extremity DVT.  ECHO completed and report pending. She will be admitted and started on a heparin gtt.      ASSESSMENT & PLAN    Acute submassive Bilateral pulmonary emboli   -s/p recent left lower extremity surgery on 12/26/20, left foot in post op boot  -CT PE study reviewed.  Positive for bilateral PE involving all lungs  -start heparin gtt, will transition to PO at discharge.  Will need at least 6 month of anti-coagulant treatment      Dyspnea on exertion  -secondary to above  -6 minute walk prior to discharge  -can use supplemental oxygen as needed to maintain sats 92%, on RA  -ECHO :  · Right ventricular cavity is moderate-to-severely dilated.  · Moderately reduced right ventricular systolic function noted.  · RV contractile morphology  consistent with positive Paredes sign     Acute deep vein thrombosis (DVT) of left lower extremity (CMS/HCC)  -venous doppler reviewed      Anxiety disorder  -resume home medication        DVT ppy:  -heparin gtt      PPI ppy:  -protonix (home med)     Full Code      RV strain on echo with RV dilation and dysfunction. Given her significant clot burden, she will benefit from EKOS or clot extraction procedure. Will transfer the patient to [facility]. Accepted by Dr. Bazzi.    Procedures Performed         Labs/radiological studies:  Lab Results (last 72 hours)     Procedure Component Value Units Date/Time    MRSA Screen Culture - Swab, Nares [[id number]] Collected:  01/21/20 1621    Specimen:  Swab from Nares Updated:  01/21/20 1647    Extra Tubes [[id number]] Collected:  01/21/20 1450    Specimen:  Blood from Arm, Left Updated:  01/21/20 1600    Narrative:       The following orders were created for panel order Extra Tubes.  Procedure                               Abnormality         Status                     ---------                               -----------         ------                     Gold Top - SST[[id number]]                                   Final result                 Please view results for these tests on the individual orders.    Gold Top - SST [[id number]] Collected:  01/21/20 1450    Specimen:  Blood from Arm, Left Updated:  01/21/20 1600     Extra Tube Hold for add-ons.     Comment: Auto resulted.       aPTT [[id number]]  (Abnormal) Collected:  01/21/20 1449    Specimen:  Blood from Arm, Right Updated:  01/21/20 1511     PTT 16.7 seconds     Protime-INR [[id number]]  (Normal) Collected:  01/21/20 1449    Specimen:  Blood from Arm, Right Updated:  01/21/20 1511     Protime 10.2 Seconds      INR 0.97    Basic Metabolic Panel [[id number]]  (Abnormal) Collected:  01/21/20 1131    Specimen:  Blood Updated:  01/21/20 1157     Glucose 109 mg/dL      BUN 11 mg/dL      Creatinine 0.94 mg/dL      Sodium  140 mmol/L      Potassium 3.9 mmol/L      Chloride 101 mmol/L      CO2 23.0 mmol/L      Calcium 9.9 mg/dL      eGFR Non African Amer 65 mL/min/1.73      BUN/Creatinine Ratio 11.7     Anion Gap 16.0 mmol/L     Narrative:       GFR Normal >60  Chronic Kidney Disease <60  Kidney Failure <15      CBC & Differential [[id number]] Collected:  01/21/20 1131    Specimen:  Blood Updated:  01/21/20 1135    Narrative:       The following orders were created for panel order CBC & Differential.  Procedure                               Abnormality         Status                     ---------                               -----------         ------                     CBC Auto Differential[[id number]]        Abnormal            Final result                 Please view results for these tests on the individual orders.    CBC Auto Differential [[id number]]  (Abnormal) Collected:  01/21/20 1131    Specimen:  Blood Updated:  01/21/20 1135     WBC 15.40 10*3/mm3      RBC 4.76 10*6/mm3      Hemoglobin 14.4 g/dL      Hematocrit 42.2 %      MCV 88.6 fL      MCH 30.2 pg      MCHC 34.1 g/dL      RDW 12.8 %      RDW-SD 39.8 fl      MPV 7.6 fL      Platelets 392 10*3/mm3      Neutrophil % 67.5 %      Lymphocyte % 26.2 %      Monocyte % 5.7 %      Eosinophil % 0.2 %      Basophil % 0.4 %      Neutrophils, Absolute 10.40 10*3/mm3      Lymphocytes, Absolute 4.00 10*3/mm3      Monocytes, Absolute 0.90 10*3/mm3      Eosinophils, Absolute 0.00 10*3/mm3      Basophils, Absolute 0.10 10*3/mm3      nRBC 0.1 /100 WBC         Ct Chest Pulmonary Embolism    Result Date: 1/21/2020  Large central bilateral pulmonary emboli,, involving all lobes of both lungs. The pulmonary emboli appear near occlusive in the proximal lobar branches. Signs of right heart strain. 2. Emergency room physician was notified prior to the time of this dictation.    Electronically Signed By-Dr. Edita Crawford MD On:1/21/2020 1:03 PM This report was finalized on [id number] by   Edita Crawford MD.      Consults:   Consults     Date and Time Order Name Status Description    1/21/2020 1429 Intensivist (on-call MD unless specified) Completed           Pertinent Test Results: as listed    Condition on Discharge:  stable    Vital Signs  Temp:  [97.6 °F (36.4 °C)-98.2 °F (36.8 °C)] 98.2 °F (36.8 °C)  Heart Rate:  [100-111] 101  Resp:  [16-20] 16  BP: (104-116)/(65-72) 116/70    Physical Exam:  Constitutional:  Well developed, well nourished, no acute distress, non-toxic appearance   Eyes:  PERRL, conjunctiva normal   HENT:  Atraumatic, external ears normal, nose normal, oropharynx moist, no pharyngeal exudates. Neck- normal range of motion, no tenderness, supple   Respiratory:  No respiratory distress, normal breath sounds, no rales, no wheezing   Cardiovascular:  Normal rate, normal rhythm, no murmurs, no gallops, no rubs   GI:  Soft, nondistended, normal bowel sounds, nontender, no organomegaly, no mass, no rebound, no guarding   :  No costovertebral angle tenderness   Musculoskeletal:  No edema, no tenderness, no deformities.  Left foot in a post surgical boot  Integument:  Well hydrated, no rash   Lymphatic:  No lymphadenopathy noted   Neurologic:  Alert & oriented x 3, CN 2-12 normal, normal motor function, normal sensory function, no focal deficits noted   Psychiatric:  Speech and behavior appropriate     Discharge Disposition  Transfer to Another Facility    Discharge Medications     Discharge Medications      ASK your doctor about these medications      Instructions Start Date   ALPRAZolam 0.25 MG tablet  Commonly known as:  XANAX   0.25 mg, Oral, 2 Times Daily PRN      cetirizine 10 MG tablet  Commonly known as:  zyrTEC   10 mg, Oral, Daily      escitalopram 20 MG tablet  Commonly known as:  LEXAPRO   20 mg, Oral, Every Night at Bedtime      multivitamin with minerals tablet tablet   1 tablet, Oral, Daily      omeprazole 20 MG capsule  Commonly known as:  priLOSEC   20 mg, Oral, Every  Night at Bedtime             Discharge Diet: NPO    Activity at Discharge: bedrest    Follow-up Appointments  No future appointments.      Test Results Pending at Discharge   Order Current Status    MRSA Screen Culture - Swab, Nares In process           Time: Discharge 31 min            Electronically signed by Tash Jacobo APRN at 01/21/20 8261

## 2020-01-23 LAB
APTT PPP: 74.8 SECONDS (ref 22.7–35.4)
APTT PPP: 78.4 SECONDS (ref 22.7–35.4)
DEPRECATED RDW RBC AUTO: 37.7 FL (ref 37–54)
EOSINOPHIL # BLD MANUAL: 0.12 10*3/MM3 (ref 0–0.4)
EOSINOPHIL NFR BLD MANUAL: 1 % (ref 0.3–6.2)
ERYTHROCYTE [DISTWIDTH] IN BLOOD BY AUTOMATED COUNT: 11.8 % (ref 12.3–15.4)
HCT VFR BLD AUTO: 30.4 % (ref 34–46.6)
HGB BLD-MCNC: 10 G/DL (ref 12–15.9)
LYMPHOCYTES # BLD MANUAL: 4.59 10*3/MM3 (ref 0.7–3.1)
LYMPHOCYTES NFR BLD MANUAL: 1 % (ref 5–12)
LYMPHOCYTES NFR BLD MANUAL: 39.4 % (ref 19.6–45.3)
MCH RBC QN AUTO: 28.6 PG (ref 26.6–33)
MCHC RBC AUTO-ENTMCNC: 32.9 G/DL (ref 31.5–35.7)
MCV RBC AUTO: 86.9 FL (ref 79–97)
MONOCYTES # BLD AUTO: 0.12 10*3/MM3 (ref 0.1–0.9)
NEUTROPHILS # BLD AUTO: 6.82 10*3/MM3 (ref 1.7–7)
NEUTROPHILS NFR BLD MANUAL: 58.6 % (ref 42.7–76)
PLAT MORPH BLD: NORMAL
PLATELET # BLD AUTO: 227 10*3/MM3 (ref 140–450)
PMV BLD AUTO: 9.4 FL (ref 6–12)
RBC # BLD AUTO: 3.5 10*6/MM3 (ref 3.77–5.28)
RBC MORPH BLD: NORMAL
WBC MORPH BLD: NORMAL
WBC NRBC COR # BLD: 11.64 10*3/MM3 (ref 3.4–10.8)

## 2020-01-23 PROCEDURE — 94762 N-INVAS EAR/PLS OXIMTRY CONT: CPT

## 2020-01-23 PROCEDURE — 99232 SBSQ HOSP IP/OBS MODERATE 35: CPT | Performed by: INTERNAL MEDICINE

## 2020-01-23 PROCEDURE — 85025 COMPLETE CBC W/AUTO DIFF WBC: CPT | Performed by: INTERNAL MEDICINE

## 2020-01-23 PROCEDURE — 85007 BL SMEAR W/DIFF WBC COUNT: CPT | Performed by: INTERNAL MEDICINE

## 2020-01-23 PROCEDURE — 85730 THROMBOPLASTIN TIME PARTIAL: CPT | Performed by: INTERNAL MEDICINE

## 2020-01-23 PROCEDURE — 25010000002 HEPARIN (PORCINE) PER 1000 UNITS: Performed by: INTERNAL MEDICINE

## 2020-01-23 RX ADMIN — SODIUM CHLORIDE, PRESERVATIVE FREE 10 ML: 5 INJECTION INTRAVENOUS at 20:12

## 2020-01-23 RX ADMIN — APIXABAN 10 MG: 5 TABLET, FILM COATED ORAL at 10:36

## 2020-01-23 RX ADMIN — PANTOPRAZOLE SODIUM 40 MG: 40 TABLET, DELAYED RELEASE ORAL at 05:47

## 2020-01-23 RX ADMIN — LOPERAMIDE HYDROCHLORIDE 4 MG: 2 CAPSULE ORAL at 08:21

## 2020-01-23 RX ADMIN — APIXABAN 10 MG: 5 TABLET, FILM COATED ORAL at 20:12

## 2020-01-23 RX ADMIN — ESCITALOPRAM 20 MG: 20 TABLET, FILM COATED ORAL at 20:12

## 2020-01-23 RX ADMIN — SODIUM CHLORIDE, PRESERVATIVE FREE 10 ML: 5 INJECTION INTRAVENOUS at 08:22

## 2020-01-23 RX ADMIN — ACETAMINOPHEN 650 MG: 325 TABLET, FILM COATED ORAL at 21:24

## 2020-01-23 RX ADMIN — HEPARIN SODIUM 20.6 UNITS/KG/HR: 10000 INJECTION, SOLUTION INTRAVENOUS at 05:41

## 2020-01-23 RX ADMIN — SODIUM CHLORIDE, PRESERVATIVE FREE 10 ML: 5 INJECTION INTRAVENOUS at 11:42

## 2020-01-23 NOTE — PROGRESS NOTES
[facility] Follow Up    Chief Complaint: Follow up bilateral pulmonary embolism    Interval History: No chest pain.  Breathing better.  Some lightheadedness with position changes.      Objective:     Objective:  Temp:  [97.6 °F (36.4 °C)-100 °F (37.8 °C)] 97.6 °F (36.4 °C)  Heart Rate:  [60-99] 60  Resp:  [16-18] 17  BP: ()/(59-85) 106/76     Intake/Output Summary (Last 24 hours) at 1/23/2020 0725  Last data filed at 1/23/2020 0557  Gross per 24 hour   Intake 2291 ml   Output 625 ml   Net 1666 ml     Body mass index is 38.98 kg/m².      01/22/20  0558 01/22/20  0742 01/23/20  0506   Weight: 127 kg (279 lb 1.6 oz) 128 kg (281 lb 4.9 oz) 127 kg (279 lb 5.2 oz)     Weight change: 1 kg (2 lb 3.3 oz)      Physical Exam:   General : Alert, cooperative, in no acute distress.  Neuro: Alert,cooperative and oriented.  Lungs: CTAB. Normal respiratory effort and rate.  CV: Regular rate and rhythm, normal S1 and S2, no murmurs, gallops or rubs.  ABD: Soft, nontender, nondistended. Positive bowel sounds.  Extr: No edema or cyanosis, moves all extremities. Right groin soft, no hematoma    Lab Review:   Results from last 7 days   Lab Units 01/21/20  1131   SODIUM mmol/L 140   POTASSIUM mmol/L 3.9   CHLORIDE mmol/L 101   CO2 mmol/L 23.0   BUN mg/dL 11   CREATININE mg/dL 0.94   GLUCOSE mg/dL 109*   CALCIUM mg/dL 9.9     Results from last 7 days   Lab Units 01/21/20  1751   TROPONIN T ng/mL 0.037*     Results from last 7 days   Lab Units 01/23/20  0138 01/22/20  0440   WBC 10*3/mm3 11.64* 14.82*   HEMOGLOBIN g/dL 10.0* 12.0   HEMATOCRIT % 30.4* 36.2   PLATELETS 10*3/mm3 227 282     Results from last 7 days   Lab Units 01/23/20  0556 01/23/20  0138  01/21/20  2129 01/21/20  1449   INR   --   --   --  1.07 0.97   APTT seconds 78.4* 74.8*   < > 36.7* 16.7*    < > = values in this interval not displayed.               Invalid input(s): LDLCALC  Results from last 7 days   Lab Units 01/21/20  1751   PROBNP pg/mL  5654.0*         I reviewed the patient's new clinical results.  I personally viewed and interpreted the patient's EKG  Current Medications:   Scheduled Meds:  escitalopram 20 mg Oral Nightly   pantoprazole 40 mg Oral Q AM   sodium chloride 10 mL Intravenous Q12H     Continuous Infusions:  heparin (porcine) 11.6 Units/kg/hr Last Rate: 20.6 Units/kg/hr (01/23/20 0541)       Allergies:  No Known Allergies    Assessment/Plan:     1. Bilateral pulmonary embolism. Now status post bilateral pulmonary artery thrombectomy.  Remains on heparin gtt.  This is likely provoked from recent surgery along with chronic oral contraceptive use.   2. Acute left lower extremity DVT.    3. Acute hypoxic respiratory failure.  On room air following procedure.  Required O2 at night.   4. Right ventricular enlargement/dysfunction  5. Pulmonary hypertension.  Resolved following thrombectomy.   6. Status post left lower extremity surgery on 12/26/2019  7. Chronic oral contraceptive use  8. Anxiety  9. Diarrhea.  Started before admission.  Improved with loperamide.     -  Will transition heparin gtt to apixaban today  -  Have patient ambulate  -  Transfer to telemetry      Joanie Chavez MD  01/23/20  7:25 AM

## 2020-01-23 NOTE — PLAN OF CARE
Problem: Patient Care Overview  Goal: Plan of Care Review  Outcome: Ongoing (interventions implemented as appropriate)  Flowsheets  Taken 1/23/2020 0536 by Raeann Frias, RN  Progress: improving  Taken 1/23/2020 0153 by Dana Dumont, RN  Plan of Care Reviewed With: patient  Outcome Summary: Patient A & O x 3.  Makes needs known. Transferred to floor from CCU around 350pm. Parents are at bedside. Lungs clear and bowel sounds present in all quads.  Pedal pulses palpable. IV sites flushed without difficulty.  No s/s of distress noted.

## 2020-01-23 NOTE — PROGRESS NOTES
LOS: 2 days   Patient Care Team:  Mary Rodgers MD as PCP - General (Internal Medicine)    Subjective     Patient reports she is doing very well she feels much better she is not getting short of breath with exertion today.  She did have some desaturation apparently with sleep.  Talking with her she would not be surprised if she has some sleep apnea she has had some symptoms that her father has sleep apnea.    Review of Systems:          Objective     Vital Signs  Vital Sign Min/Max for last 24 hours  Temp  Min: 97.6 °F (36.4 °C)  Max: 100 °F (37.8 °C)   BP  Min: 97/69  Max: 133/80   Pulse  Min: 60  Max: 99   Resp  Min: 16  Max: 18   SpO2  Min: 89 %  Max: 98 %   Flow (L/min)  Min: 2  Max: 10   Weight  Min: 127 kg (279 lb 5.2 oz)  Max: 127 kg (279 lb 5.2 oz)        Ventilator/Non-Invasive Ventilation Settings (From admission, onward)    None                       Body mass index is 38.98 kg/m².  I/O last 3 completed shifts:  In: 2491 [P.O.:360; I.V.:2131]  Out: 725 [Urine:725]  No intake/output data recorded.        Physical Exam:  General Appearance: Well-developed obese white female she is resting comfortably in bed on room air with oxygen saturations in the mid 90s.  She is not desaturating with talking.  Eyes: Conjunctiva are clear and anicteric  ENT: Mucous membranes are moist no erythema or exudates he has a Mallampati type II airway  Neck: A little large, trachea is midline I do not appreciate jugular venous distention or hepatojugular reflux.  No palpable lymphadenopathy or thyromegaly  Lungs: Clear no wheezes rales or rhonchi nonlabored symmetric expansion  Cardiac: Regular rate rhythm no murmur  Abdomen: Obese soft no palpable hepatosplenomegaly  : Not examined  Musculoskeletal: She has a Band-Aid on the dorsum of her left foot and over her Achilles area.  Her left calf tenderness is decreased and the swelling has decreased.  Right groin cath site is soft no significant ecchymoses or  mass  Skin: No jaundice no petechiae skin is warm and dry  Neuro: She is alert oriented cooperative following commands grossly intact  Extremities/P Vascular: No clubbing no cyanosis she has palpable radial and dorsalis pedis pulses bilaterally  MSE: Seems much more relaxed and much better spirits today       Labs:  Results from last 7 days   Lab Units 01/21/20  1131   GLUCOSE mg/dL 109*   SODIUM mmol/L 140   POTASSIUM mmol/L 3.9   CO2 mmol/L 23.0   CHLORIDE mmol/L 101   ANION GAP mmol/L 16.0*   CREATININE mg/dL 0.94   BUN mg/dL 11   BUN / CREAT RATIO  11.7   CALCIUM mg/dL 9.9   EGFR IF NONAFRICN AM mL/min/1.73 65     Estimated Creatinine Clearance: 112.5 mL/min (by C-G formula based on SCr of 0.94 mg/dL).      Results from last 7 days   Lab Units 01/23/20  0138 01/22/20  0440 01/21/20  2129 01/21/20  1131   WBC 10*3/mm3 11.64* 14.82* 15.18* 15.40*   RBC 10*6/mm3 3.50* 4.19 4.33 4.76   HEMOGLOBIN g/dL 10.0* 12.0 12.5 14.4   HEMATOCRIT % 30.4* 36.2 37.6 42.2   MCV fL 86.9 86.4 86.8 88.6   MCH pg 28.6 28.6 28.9 30.2   MCHC g/dL 32.9 33.1 33.2 34.1   RDW % 11.8* 12.4 12.0* 12.8   RDW-SD fl 37.7 39.4 38.3 39.8   MPV fL 9.4 9.3 9.4 7.6   PLATELETS 10*3/mm3 227 282 300 392   NEUTROPHIL % %  --   --  51.2 67.5   LYMPHOCYTE % %  --   --  43.5 26.2   MONOCYTES % %  --   --  4.4* 5.7   EOSINOPHIL % %  --   --  0.3 0.2*   BASOPHIL % %  --   --  0.3 0.4   IMM GRAN % %  --   --  0.3  --    NEUTROS ABS 10*3/mm3 6.82 9.84* 7.78* 10.40*   LYMPHS ABS 10*3/mm3  --   --  6.61* 4.00*   MONOS ABS 10*3/mm3  --   --  0.67 0.90   EOS ABS 10*3/mm3 0.12 0.24 0.04 0.00   BASOS ABS 10*3/mm3  --   --  0.04 0.10   IMMATURE GRANS (ABS) 10*3/mm3  --   --  0.04  --    NRBC /100 WBC  --   --  0.0 0.1         Results from last 7 days   Lab Units 01/21/20  1751   TROPONIN T ng/mL 0.037*     Results from last 7 days   Lab Units 01/21/20  1751   PROBNP pg/mL 5,654.0*             Results from last 7 days   Lab Units 01/21/20  2129 01/21/20  1449   INR   1.07 0.97     Microbiology Results (last 10 days)     Procedure Component Value - Date/Time    MRSA Screen, PCR - Swab, Nares [[id number]]  (Normal) Collected:  01/21/20 1800    Lab Status:  Final result Specimen:  Swab from Nares Updated:  01/21/20 2152     MRSA PCR No MRSA Detected                apixaban 10 mg Oral Q12H   Followed by      [START ON 1/30/2020] apixaban 5 mg Oral Q12H   escitalopram 20 mg Oral Nightly   pantoprazole 40 mg Oral Q AM   sodium chloride 10 mL Intravenous Q12H       heparin (porcine) 11.6 Units/kg/hr Last Rate: 20.6 Units/kg/hr (01/23/20 0744)       Diagnostics:  Ct Chest Pulmonary Embolism    Result Date: 1/21/2020  CT CHEST PULMONARY EMBOLISM-  Date of Exam: 1/21/2020 12:30 PM  Indication: Short of breath with exertion and recent left lower extremity surgery.  . Elevated heart rate.  Comparison: None available.  Technique: Serial and axial CT images of the chest were obtained following the uneventful intravenous administration of 100 cc Isovue-370. contrast. Reconstructions in the coronal and sagittal planes were also performed.  Automated exposure control and iterative reconstruction methods were used.  FINDINGS:  Extensive bilateral pulmonary emboli are present. There is a large embolism within the right main pulmonary artery, and there is near occlusive embolism within the proximal right upper, right middle and right lower lobe pulmonary arteries extending into the subsegmental branches. There is embolic disease in the distal left main pulmonary artery, with near occlusive thrombus within the left upper and left lower lobe pulmonary arteries, extending into the subsegmental branches.  There is concavity of the interventricular septum the heart suggesting right heart strain. Heart size is normal. No pericardial effusion.  No acute airspace disease. Benign calcified granuloma within the right lower lobe.    Cholecystectomy. Small second duodenal segment diverticulum. Remainder of  included upper abdominal organs are within normal limits.  No acute osseous abnormality.       Large central bilateral pulmonary emboli,, involving all lobes of both lungs. The pulmonary emboli appear near occlusive in the proximal lobar branches. Signs of right heart strain. 2. Emergency room physician was notified prior to the time of this dictation.    Electronically Signed By-Dr. dEita Crawford MD On:1/21/2020 1:03 PM This report was finalized on [id number] by Dr. Edita Crawford MD.    Results for orders placed during the hospital encounter of 01/21/20   ADULT TRANSTHORACIC ECHO COMPLETE W/ CONT IF NECESSARY PER PROTOCOL    Narrative · Left ventricular wall thickness is consistent with mild concentric   hypertrophy.  · Estimated EF = 55%.  · Left ventricular systolic function is normal.  · Mild tricuspid valve regurgitation is present.  · Left ventricular diastolic dysfunction (grade I) consistent with   impaired relaxation.  · Left atrial cavity size is mildly dilated.  · Right ventricular cavity is moderate-to-severely dilated.  · Moderately reduced right ventricular systolic function noted.  · RV contractile morphology consistent with positive Paredes sign          EKG shows sinus rhythm with some anterior T inversion    Active Hospital Problems    Diagnosis  POA   • Pulmonary embolism (CMS/HCC) [I26.99]  Yes      Resolved Hospital Problems   No resolved problems to display.         Assessment/Plan     1. Bilateral pulmonary emboli with evidence of RV strain and moderate to severe RV dilation on echocardiogram with elevated proBNP and troponin.  acute cor pulmonale.  Status post bilateral thrombectomy.  On heparin drip to transition to apixaban today  2. Left lower extremity DVT in the gastrocnemius/soleus vein provoked clot with the surgery on that left lower extremity oral contraceptives and obesity.  She probably should try and avoid oral contraceptives in the future and find another method of  contraception.  She can talk with her OB about this.  I have talked to her about some precautions.  She is going to need anticoagulation probably with the extent of her pulmonary emboli 6 months   3. acute hypoxic respiratory failure secondary to #1 much improved did require couple liters O2 with sleep.  I will check a nocturnal oximetry tonight if she drops significantly in particular looking at the pattern determine if she needs O2 at discharge and possibly a sleep study  4. Obesity weight loss would be beneficial  5. Recent 12/26/2019 left lower extremity surgery apparently they removed necrotic bone and cartilage that occurred as a result of a stress fracture she also had a tendon release  6. Anemia mild not unexpected after her thrombectomy will just follow hemoglobin    Plan for disposition:    Jairo Ag MD  01/23/20  8:31 AM    Time:

## 2020-01-23 NOTE — PROGRESS NOTES
Continued Stay Note  [facility]     Patient Name: Merry Melara  MRN: [id number]  Today's Date: 1/23/2020    Admit Date: 1/21/2020    Discharge Plan     Row Name 01/23/20 1046       Plan    Plan  Home with family    Plan Comments  $10 co pay card given for Amos    Row Name 01/23/20 9423       Plan    Plan  Home with Family    Patient/Family in Agreement with Plan  yes    Plan Comments  Met with patient and visitors at bedside, patient granted me permission to speak to her while visitors remained in the room.  Face sheet verified.  Patient had foot surgery in late December, and since that time she has had some limitations with her walking, but she states she is able to perform all aspects of her ADL's.  She does not use any special or adaptive equipment, except for her Boot.  Patient does not have a living will but she states Wilfredo Melara  [phone] knows her wishes.  She uses the "[facility] in [place], she denies any issues affording or taking her medications.  Explained meds to beds, she is agreeable and understands she is responsible for any co-pays or deductibles.  Discharge plans discussed.  Discharge goal is to return home with family support. After review and discussion, discharge plan appears appropriate at this time. Family or friend should be able to transport. Will continue to monitor for new or changing discharge needs.        Discharge Codes    No documentation.             Stefanie Oquendo RN

## 2020-01-23 NOTE — PLAN OF CARE
Problem: Patient Care Overview  Goal: Plan of Care Review  Outcome: Ongoing (interventions implemented as appropriate)  Flowsheets  Taken 1/23/2020 0537  Progress: improving  Outcome Summary: Pt had thrombectomy during day. Breathing much better, non labored, O2 2L NC only needed during sleep. Pt says she feels much better denies any CP or SOB. Vitals stable, heparin going at 20.6 u/kg/hr and is theraputic per AM lab draw. Pt tolerating food and water. Pt seems emotionally much more positive. Significantly improved.  RN  Taken 1/23/2020 0400  Plan of Care Reviewed With: patient

## 2020-01-23 NOTE — PAYOR COMM NOTE
"Merry Lomeli (44 y.o. Female)                              ATTENTION;   NURSE REVIEW, AUTH PENDING [id number], REPLY TO UR DEPT KO PRESSLEY N  UR  [phone] , PATIENT ADMITTED TO CCU       Date of Birth Social Security Number Address Home Phone MRN    1975  [address] [place] [address] [place] [address]  [id number]    Shinto Marital Status          Hinduism        Admission Date Admission Type Admitting Provider Attending Provider Department, Room/Bed    20 Urgent Joanie Chavez MD Gondi, Sreedevi, MD [facility] CORONARY CARE, N340/    Discharge Date Discharge Disposition Discharge Destination                       Attending Provider:  Joanie Chavez MD    Allergies:  No Known Allergies    Isolation:  None   Infection:  None   Code Status:  CPR    Ht:  180.3 cm (70.98\")   Wt:  127 kg (279 lb 5.2 oz)    Admission Cmt:  None   Principal Problem:  None                Active Insurance as of 2020     Primary Coverage     Payor Plan Insurance Group Employer/Plan Group    [employer] BLUE [employer]OSS [employer] CR[employer] BLUE [employer] PPO [id number]     Payor Plan Address Payor Plan Phone Number Payor Plan Fax Number Effective Dates    [address] [phone]  2011 - None Entered    [place] [address]       Subscriber Name Subscriber Birth Date Member ID       NIKIA LOMELI  [id number]                 Emergency Contacts      (Rel.) Home Phone Work Phone Mobile Phone    vic lomeli (Spouse) -- -- [phone]               History & Physical      Joanie Chavez MD at 20 [address] [place] [address] Cardiology History and Physical    Patient Name: Merry Lomeli  :1975  44 y.o.    Date of Admission: 2020  Date of Consultation:  20  Encounter Provider: Joanie Chavez MD  Place of Service: [facility] CARDIOLOGY  Referring Provider: Corey Bazzi MD  Patient Care " Team:  Mary Rodgers MD as PCP - General (Internal Medicine)      Chief complaint: Pulmonary Embolism    History of Present Illness:    Ms Melara is a 44 year old patient with a history of anxiety who is admitted with bilateral pulmonary embolism.     The patient presented to [facility] yesterday with worsening dyspnea on exertion starting last week. This was associated with chest tightness.  She initially thought the symptoms were due to anxiety but it did not improve with use of alprazolam.  The day prior to her presentation she noted that she could not even go from her couch to her bathroom without getting severely short of breath.    She had recent left lower extremity surgery on 12/26/2019 to remove necrotic bone and cartilage and release a tendon.  She reports she only decrease her activity for a few days and was up moving around normally a week following her surgery.  She reports that her family even complained she was doing too much too quickly. She reports left lower extremity swelling but attributed this to her surgery and that it was improving as expected.     Following her arrival to the [facility] emergency room a CT angiogram of the chest showed evidence of bilateral pulmonary embolism with a large amount of proximal thrombus and evidence of right heart strain.  She additionally underwent an echocardiogram that showed evidence of right ventricular enlargement with decreased RV function.  Lower extremity venous doppler showed evidence of acute left calf DVT.  She was started on a heparin gtt and transferred to [place] for further management.     She denies any long car or plane trips.  She denies any family history of clotting issues.  She denies any personal history of bleeding issues.  She is on an oral contraceptive.      Previous Cardiac Testing  ECHO 01/21/2020  · Left ventricular wall thickness is consistent with mild concentric hypertrophy.  · Estimated EF = 55%.  · Left  ventricular systolic function is normal.  · Mild tricuspid valve regurgitation is present.  · Left ventricular diastolic dysfunction (grade I) consistent with impaired relaxation.  · Left atrial cavity size is mildly dilated.  · Right ventricular cavity is moderate-to-severely dilated.  · Moderately reduced right ventricular systolic function noted.  · RV contractile morphology consistent with positive Paredes sign           Past Medical History:   Diagnosis Date   • Anxiety        Past Surgical History:   Procedure Laterality Date   • CHOLECYSTECTOMY     • DILATATION AND CURETTAGE     • TENDON RELEASE     • TONSILLECTOMY           Prior to Admission medications    Medication Sig Start Date End Date Taking? Authorizing Provider   ALPRAZolam (XANAX) 0.25 MG tablet Take 0.25 mg by mouth 2 (Two) Times a Day As Needed for Anxiety.   Yes Dee Barnes MD   cetirizine (zyrTEC) 10 MG tablet Take 10 mg by mouth Daily.   Yes Dee Barnes MD   escitalopram (LEXAPRO) 20 MG tablet Take 20 mg by mouth every night at bedtime.   Yes Dee Barnes MD   Multiple Vitamins-Minerals (MULTIVITAMIN WITH MINERALS) tablet tablet Take 1 tablet by mouth Daily.   Yes Dee Barnes MD   omeprazole (priLOSEC) 20 MG capsule Take 20 mg by mouth every night at bedtime.   Yes Dee Barnes MD       No Known Allergies    Social History     Socioeconomic History   • Marital status:      Spouse name: Not on file   • Number of children: Not on file   • Years of education: Not on file   • Highest education level: Not on file   Tobacco Use   • Smoking status: Former Smoker     Last attempt to quit:      Years since quittin.0   Substance and Sexual Activity   • Alcohol use: Never     Frequency: Never     Comment: social   • Drug use: Never   • Sexual activity: Defer       No family history on file.    REVIEW OF SYSTEMS:   All systems reviewed.  Pertinent positives identified in HPI.  All other  systems are negative.      Objective:     Vitals:    01/22/20 0558 01/22/20 0600 01/22/20 0630 01/22/20 0700   BP:  108/71 113/79 124/91   Pulse:  82 77 98   Temp:       TempSrc:       SpO2:  92% 93% 91%   Weight: 127 kg (279 lb 1.6 oz)        Body mass index is 38.93 kg/m².    General Appearance:    Alert, cooperative, in no acute distress   Head:    Normocephalic, without obvious abnormality, atraumatic   Eyes:            Lids and lashes normal, conjunctivae and sclerae normal, no icterus, no pallor, corneas clear, PERRLA   Ears:    Ears appear intact with no abnormalities noted   Neck:   No adenopathy, supple, trachea midline, no thyromegaly, no carotid bruit, no JVD   Lungs:     Clear to auscultation, respirations regular, even and unlabored    Heart:    Regular rhythm and normal rate, normal S1 and S2, no murmur, no gallop, no rub, no click   Chest Wall:    No abnormalities observed   Abdomen:     Normal bowel sounds, no masses, no organomegaly, soft, nontender, nondistended, no guarding, no rebound  tenderness   Extremities:   Moves all extremities well, no edema, no cyanosis, no redness   Pulses:   Pulses palpable and equal bilaterally. Normal radial, carotid, femoral, dorsalis pedis and posterior tibial pulses bilaterally. Normal abdominal aorta   Skin:  Psychiatric:   No bleeding, bruising or rash    Alert and oriented x 3, normal mood and affect   Lab Review:     Results from last 7 days   Lab Units 01/21/20  1131   SODIUM mmol/L 140   POTASSIUM mmol/L 3.9   CHLORIDE mmol/L 101   CO2 mmol/L 23.0   BUN mg/dL 11   CREATININE mg/dL 0.94   CALCIUM mg/dL 9.9   GLUCOSE mg/dL 109*     Results from last 7 days   Lab Units 01/21/20  1751   TROPONIN T ng/mL 0.037*     Results from last 7 days   Lab Units 01/22/20  0440   WBC 10*3/mm3 14.82*   HEMOGLOBIN g/dL 12.0   HEMATOCRIT % 36.2   PLATELETS 10*3/mm3 282     Results from last 7 days   Lab Units 01/22/20  0440 01/21/20  2129 01/21/20  1449   INR   --  1.07 0.97    APTT seconds 48.1* 36.7* 16.7*                     CT of chest      IMPRESSION:  Large central bilateral pulmonary emboli,, involving all lobes of both  lungs. The pulmonary emboli appear near occlusive in the proximal lobar  branches. Signs of right heart strain.  2. Emergency room physician was notified prior to the time of this  dictation.    EKG this AM    EKG on arrival      I personally viewed and interpreted the patient's EKG/Telemetry data.        Assessment and Plan:       1. Bilateral pulmonary embolism. On review of CT she appears to have a large amount of proximal thrombus bilaterally.  CT, echo and abnormal troponin are consistent with RV strain.  On heparin gtt.  This is likely provoked from recent surgery along with chronic oral contraceptive use.   2. Acute left lower extremity DVT.    3. Acute hypoxic respiratory failure.  Requiring 2.5 liters nasal cannula.   4. Right ventricular enlargement/dysfunction  5. Status post left lower extremity surgery on 12/26/2019  6. Chronic oral contraceptive use  7. Anxiety    -  Catheter directed intervention is indicated and I recommended proceeding with thrombectomy today.  I explained the reasoning for my recommendation, the procedure, risks and benefits at length with the patient and her family.  She agrees to proceed.   -  Continue heparin gtt.     Joanie Chavez MD  01/22/20  7:45 AM                Electronically signed by Joanie Chavez MD at 01/22/20 1201           Vital Signs (last 3 days)     Date/Time   Temp   Temp src   Pulse   Resp   BP   Patient Position   SpO2    01/23/20 0900   --   --   75   --   106/88   --   96    01/23/20 0806   --   --   --   --   120/82   --   94    01/23/20 0800   --   --   73   --   --   --   --    01/23/20 0716   98.3 (36.8)   Oral   65   18   113/88   Lying   95    01/23/20 0600   --   --   60   --   106/76   --   91    01/23/20 0500   --   --   68   --   108/69   --   93    01/23/20 0300   --   --   70   --   100/66    --   94    01/23/20 0200   --   --   67   --   99/63   --   94    01/23/20 0100   --   --   73   --   103/59   --   95    01/23/20 0000   97.6 (36.4)   Oral   74   --   120/78   --   90    01/22/20 2300   --   --   75   --   97/69   --   (!) 89    01/22/20 2200   --   --   83   --   103/66   --   90    01/22/20 2100   --   --   75   --   114/66   --   90    01/22/20 2000   --   --   81   --   133/80   --   93    01/22/20 1957   97.7 (36.5)   Oral   --   --   --   --   --    01/22/20 1900   --   --   77   --   123/79   --   94    01/22/20 1800   --   --   82   --   117/80   --   92    01/22/20 1700   --   --   75   --   110/73   --   94    01/22/20 1646   97.9 (36.6)   Oral   --   --   --   --   --    01/22/20 1600   --   --   75   --   114/75   --   92    01/22/20 1541   --   --   72   --   112/70   --   96    01/22/20 1525   97.8 (36.6)   Oral   --   --   110/73   --   --    01/22/20 14:51:39   --   --   --   17   --   --   --    01/22/20 14:46:31   --   --   --   16   --   --   --    01/22/20 14:41:52   --   --   --   --   --   --   95    01/22/20 14:41:12   --   --   --   17   --   --   --    01/22/20 14:39:52   --   --   --   --   --   --   98    01/22/20 14:35:26   --   --   --   18   --   --   --    01/22/20 14:30:38   --   --   --   18   --   --   --    01/22/20 14:25:47   --   --   --   18   --   --   --    01/22/20 14:20:57   --   --   --   17   --   --   --    01/22/20 14:16:25   --   --   --   18   --   --   --    01/22/20 14:10:36   --   --   --   18   --   --   --    01/22/20 14:06:03   --   --   --   18   --   --   --    01/22/20 14:01:28   --   --   --   16   --   --   --    01/22/20 13:56:46   --   --   --   17   --   --   --    01/22/20 13:51:27   --   --   --   17   --   --   --    01/22/20 13:46:11   --   --   --   18   --   --   --    01/22/20 13:41:39   --   --   --   18   --   --   --    01/22/20 13:36:51   --   --   --   18   --   --   --    01/22/20 13:31:41   --   --   --   18   --   --    --    01/22/20 13:26:22   --   --   --   18   --   --   --    01/22/20 13:21:48   --   --   --   18   --   --   --    01/22/20 13:16:51   --   --   --   18   --   --   --    01/22/20 13:11:28   --   --   --   17   --   --   --    01/22/20 13:06:25   --   --   --   17   --   --   --    01/22/20 12:59:01   --   --   --   --   --   --   92 01/22/20 1200   --   --   91   --   102/70   --   91 01/22/20 1147   100 (37.8)   Oral   --   --   --   --   --    01/22/20 1100   --   --   88   --   132/85   --   92 01/22/20 1000   --   --   99   --   119/82   --   92    01/22/20 0900   --   --   86   --   123/79   --   90 01/22/20 0800   97.7 (36.5)   Oral   83   16   110/76   Lying   91    01/22/20 0700   --   --   98   --   124/91   --   91 01/22/20 0630   --   --   77   --   113/79   --   93 01/22/20 0600   --   --   82   --   108/71   --   92 01/22/20 0515   --   --   80   --   114/77   --   93    01/22/20 0400   97.5 (36.4)   Oral   83   --   109/81   --   --    01/22/20 0345   --   --   --   --   --   --   90 01/22/20 0300   --   --   91   --   118/89   --   92    01/22/20 0230   --   --   107   --   127/89   --   93 01/22/20 0200   --   --   85   --   109/74   --   91    01/22/20 0130   --   --   78   --   104/64   --   92    01/22/20 0100   --   --   93   --   115/79   --   91    01/22/20 0015   --   --   --   --   --   --   93    01/22/20 0000   97.5 (36.4)   Oral   --   --   --   --   92    01/21/20 2345   --   --   86   --   108/76   --   --    01/21/20 2315   --   --   96   --   123/83   --   93    01/21/20 2300   --   --   98   --   121/80   --   92    01/21/20 2200   --   --   98   --   115/78   --   92    01/21/20 2100   --   --   98   --   107/80   --   90    01/21/20 2030   98 (36.7)   Oral   93   --   111/66   --   95              Oxygen Therapy (last 3 days)     Date/Time   SpO2   Device (Oxygen Therapy)   Flow (L/min)   Oxygen Concentration (%)   ETCO2 (mmHg)    01/23/20 0900   96    --   --   --   --    01/23/20 0806   94   --   --   --   --    01/23/20 0716   95   room air   --   --   --    01/23/20 0600   91   --   --   --   --    01/23/20 0500   93   --   --   --   --    01/23/20 0400   --   room air   --   --   --    01/23/20 0300   94   --   --   --   --    01/23/20 0200   94   --   --   --   --    01/23/20 0100   95   --   --   --   --    01/23/20 0000   90   room air   2   --   --    01/22/20 2300   (!) 89   --   --   --   --    01/22/20 2200   90   --   --   --   --    01/22/20 2100   90   --   --   --   --    01/22/20 2000 93   --   --   --   --    01/22/20 1955   --   room air   --   --   --    01/22/20 1900   94   --   --   --   --    01/22/20 1800   92   --   --   --   --    01/22/20 1700   94   --   --   --   --    01/22/20 1600   92   --   --   --   --    01/22/20 1541   96   --   --   --   --    01/22/20 1525   --   room air   --   --   --    01/22/20 14:41:52   95   nasal cannula   2   --   --    01/22/20 14:39:52   98   nasal cannula   4   --   --    01/22/20 12:59:01   92   nonrebreather mask   10   --   --    01/22/20 1200   91   nasal cannula   2.5   --   --    01/22/20 1100   92   --   --   --   --    01/22/20 1000   92   --   --   --   --    01/22/20 0900   90   --   --   --   --    01/22/20 0800   91   nasal cannula   2.5   --   --    01/22/20 0700   91   --   --   --   --    01/22/20 0630   93   --   --   --   --    01/22/20 0600   92   --   --   --   --    01/22/20 0515   93   --   --   --   --    01/22/20 0400   --   nasal cannula   2.5   --   --    01/22/20 0345   90   --   --   --   --    01/22/20 0300   92   --   --   --   --    01/22/20 0230   93   --   --   --   --    01/22/20 0200 91   --   --   --   --    01/22/20 0130   92   --   --   --   --    01/22/20 0100   91   --   --   --   --    01/22/20 0015   93   --   --   --   --    01/22/20 0000   92   nasal cannula   2   --   --    01/21/20 2315   93   --   --   --   --    01/21/20 2300   92   --   --   --    --    01/21/20 2200   92   --   --   --   --    01/21/20 2100   90   --   --   --   --    01/21/20 2030   95   room air   --   --   --            Lines, Drains & Airways    Active LDAs     Name:   Placement date:   Placement time:   Site:   Days:    Peripheral IV 01/21/20 1100 Left Antecubital   01/21/20    1100    Antecubital   1    Peripheral IV 01/22/20 2008 Right Hand   01/22/20 2008 present at the start of shift    Hand   less than 1    Peripheral IV 01/22/20 2010 Right Wrist   01/22/20 2010 present at the start of shift    Wrist   less than 1         Inactive LDAs     Name:   Placement date:   Placement time:   Removal date:   Removal time:   Site:   Days:    [REMOVED] Peripheral IV 01/22/20 0430 Left;Posterior Hand   01/22/20    0430    01/22/20 2007 not present at the start of shift    Hand   less than 1    [REMOVED] Arterial Sheath  Right Femoral   01/22/20    1316    01/22/20    1447    Femoral   less than 1                  Facility-Administered Medications as of 1/23/2020   Medication Dose Route Frequency Provider Last Rate Last Dose   • acetaminophen (TYLENOL) tablet 650 mg  650 mg Oral Q4H PRN Joanie Chavez MD   650 mg at 01/22/20 2319    Or   • acetaminophen (TYLENOL) 160 MG/5ML solution 650 mg  650 mg Oral Q4H PRN Joanie Chavez MD        Or   • acetaminophen (TYLENOL) suppository 650 mg  650 mg Rectal Q4H PRN Joanie Chavez MD       • acetaminophen (TYLENOL) tablet 650 mg  650 mg Oral Q4H PRN Joanie Chavez MD       • apixaban (ELIQUIS) tablet 10 mg  10 mg Oral Q12H Joanie Chavez MD        Followed by   • [START ON 1/30/2020] apixaban (ELIQUIS) tablet 5 mg  5 mg Oral Q12H Joanie Chavez MD       • escitalopram (LEXAPRO) tablet 20 mg  20 mg Oral Nightly Joanie Chavez MD   20 mg at 01/22/20 2000   • heparin 58525 units/250 mL (100 units/mL) in 0.45 % NaCl infusion  11.6 Units/kg/hr Intravenous Titrated Joanie Chavez MD 26.5 mL/hr at 01/23/20 0744 20.6 Units/kg/hr at  20 0744   • [COMPLETED] heparin bolus from bag 10,000 Units  10,000 Units Intravenous Once Adrian Ruvalcaba MD   10,000 Units at 20 1508   • HYDROcodone-acetaminophen (NORCO) 5-325 MG per tablet 1 tablet  1 tablet Oral Q4H PRN Joanie Chavez MD       • [COMPLETED] iopamidol (ISOVUE-370) 76 % injection 100 mL  100 mL Intravenous Once in imaging Adrian Ruvalcaba MD   100 mL at 20 1230   • loperamide (IMODIUM) capsule 4 mg  4 mg Oral 4x Daily PRN Joanie Chavez MD   4 mg at 20 0821   • morphine injection 1 mg  1 mg Intravenous Q4H PRN Joanie Chavez MD        And   • naloxone (NARCAN) injection 0.4 mg  0.4 mg Intravenous Q5 Min PRN Joanie Chavez MD       • morphine injection 2 mg  2 mg Intravenous Q2H PRN Joanie Chavez MD       • ondansetron (ZOFRAN) tablet 4 mg  4 mg Oral Q6H PRN Joanie Chavez MD        Or   • ondansetron (ZOFRAN) injection 4 mg  4 mg Intravenous Q6H PRN Joanie Chavez MD       • pantoprazole (PROTONIX) EC tablet 40 mg  40 mg Oral Q AM Joanie Chavez MD   40 mg at 20 0547   • [COMPLETED] sodium chloride 0.9 % bolus 1,000 mL  1,000 mL Intravenous Once Adrian Ruvalcaba MD 2,000 mL/hr at 20 1125 1,000 mL at 20 1125   • sodium chloride 0.9 % flush 10 mL  10 mL Intravenous Q12H Joanie Chavez MD   10 mL at 20 0822   • sodium chloride 0.9 % flush 10 mL  10 mL Intravenous PRN Joanie Chavez MD   10 mL at 20 0822   • [COMPLETED] sodium chloride 0.9 % infusion    Continuous PRN Joanie Chavez MD 50 mL/hr at 20 1300 50 mL/hr at 20 1300   • [] sodium chloride 0.9 % infusion  75 mL/hr Intravenous Continuous Joanie Chavez MD 75 mL/hr at 20 1618 75 mL/hr at 20 1618   • [COMPLETED] Sulfur Hexafluoride Microsph 60.7-25 MG reconstituted suspension 2 mL  2 mL Intravenous Once Adrian Ruvalcaba MD   2 mL at 20 1410   • zolpidem (AMBIEN) tablet 5 mg  5 mg Oral Nightly PRN Scott,  MD Joanie         Orders (last 72 hrs)      Start     Ordered    01/30/20 0000  apixaban (ELIQUIS) tablet 5 mg  Every 12 Hours Scheduled      01/23/20 0826    01/30/20 0000  apixaban (ELIQUIS) 5 MG tablet tablet  Every 12 Hours Scheduled      01/23/20 0914    01/24/20 0600  CBC (No Diff)  Morning Draw      01/23/20 0922    01/23/20 2230  Overnight Sleep Oximetry Study  Bedtime Once - RT     Comments:  Started on room air she spends more than 15 minutes with her oxygen saturation less than 89% then may place her back on O2    01/23/20 0931    01/23/20 0915  apixaban (ELIQUIS) tablet 10 mg  Every 12 Hours Scheduled      01/23/20 0826    01/23/20 0830  Transfer Patient  Once      01/23/20 0831    01/23/20 0827  Discontinue heparin gtt one hour after the patient receives first dose of Eliquis.  Misc Nursing Order (Specify)  Once     Comments:  Discontinue heparin gtt one hour after the patient receives first dose of Eliquis.    01/23/20 0827    01/23/20 0600  aPTT  Daily      01/22/20 0555    01/23/20 0600  CBC Auto Differential  PROCEDURE ONCE     Comments:  Discontinue After Heparin Stopped      01/23/20 0002    01/23/20 0600  aPTT  Once,   Status:  Canceled      01/23/20 0216    01/23/20 0556  aPTT  Once      01/23/20 0555    01/23/20 0150  Manual Differential  Once     Comments:  Discontinue After Heparin Stopped      01/23/20 0149    01/23/20 0131  aPTT  Once      01/23/20 0130    01/23/20 0000  apixaban (ELIQUIS) 5 MG tablet tablet  Every 12 Hours Scheduled      01/23/20 0914    01/22/20 2000  Strict intake and output  Every 4 Hours      01/22/20 1604    01/22/20 1800  aPTT  Once      01/22/20 1200    01/22/20 1700  sodium chloride 0.9 % infusion  Continuous      01/22/20 1603    01/22/20 1605  Obtain STAT EKG during chest pain. Notify MD of any change in rhythm, ST segments or complaints of chest pain.  Until Discontinued      01/22/20 1604    01/22/20 1605  Encourage fluids  Until Discontinued      01/22/20  1604    01/22/20 1605  Notify MD if platelet count is less than 100,000, is less than 1/2 baseline, or if Hgb drops by more than 3mg/dl.  Until Discontinued      01/22/20 1604    01/22/20 1605  Notify MD of hypotension (SBP less than 95), bleeding, or dysrythmia and follow Sheath Removal Policy if needed.  Until Discontinued      01/22/20 1604    01/22/20 1605  Sheath Pulled in Lab  Once      01/22/20 1604    01/22/20 1605  Assess Puncture Site, Vital Signs, Distal Pulses & Observe Site for Bleeding or Swelling  Per Order Details     Comments:  Every 15 Minutes x4, Every 30 Minutes x4, & Every 1 Hour x2    01/22/20 1604    01/22/20 1604  Activity - Strict Bed Rest  Until Discontinued,   Status:  Canceled      01/22/20 1603    01/22/20 1604  Verify Discontinuation of enoxaparin (LOVENOX) and / or heparin  Once      01/22/20 1603    01/22/20 1604  Oxygen Therapy- Nasal Cannula; Titrate for SPO2: equal to or greater than, 92%, per policy  Continuous      01/22/20 1603    01/22/20 1604  Continuous Pulse Oximetry  Continuous      01/22/20 1603    01/22/20 1604  Advance diet as tolerated  Until Discontinued      01/22/20 1603    01/22/20 1604  Diet Regular  Diet Effective Now      01/22/20 1603    01/22/20 1603  morphine injection 1 mg  Every 4 Hours PRN      01/22/20 1603    01/22/20 1603  naloxone (NARCAN) injection 0.4 mg  Every 5 Minutes PRN      01/22/20 1603    01/22/20 1603  ondansetron (ZOFRAN) tablet 4 mg  Every 6 Hours PRN      01/22/20 1603    01/22/20 1603  ondansetron (ZOFRAN) injection 4 mg  Every 6 Hours PRN      01/22/20 1603    01/22/20 1603  acetaminophen (TYLENOL) tablet 650 mg  Every 4 Hours PRN      01/22/20 1603    01/22/20 1603  HYDROcodone-acetaminophen (NORCO) 5-325 MG per tablet 1 tablet  Every 4 Hours PRN      01/22/20 1603    01/22/20 1528  POC Glucose Once  Once      01/22/20 1518    01/22/20 1441  Cardiac Catheterization/Vascular Study  Once      01/22/20 1440    01/22/20 1437  iopamidol  (ISOVUE-370) 76 % injection  As Needed,   Status:  Discontinued      01/22/20 1437    01/22/20 1336  heparin (porcine) injection  As Needed,   Status:  Discontinued      01/22/20 1336    01/22/20 1312  lidocaine (XYLOCAINE) 2% injection  As Needed,   Status:  Discontinued      01/22/20 1312    01/22/20 1308  fentaNYL citrate (PF) (SUBLIMAZE) injection  As Needed,   Status:  Discontinued      01/22/20 1309    01/22/20 1308  midazolam (VERSED) injection  As Needed,   Status:  Discontinued      01/22/20 1308    01/22/20 1300  sodium chloride 0.9 % infusion  Continuous PRN      01/22/20 1300    01/22/20 1300  pantoprazole (PROTONIX) EC tablet 40 mg  Every Early Morning      01/22/20 1147    01/22/20 1145  aPTT  Once      01/22/20 0550    01/22/20 1111  POC Glucose Once  Once      01/21/20 2004    01/22/20 0900  sodium chloride 0.9 % flush 10 mL  Every 12 Hours Scheduled      01/22/20 0741    01/22/20 0856  Invasive peripheral vascular study  Once      01/22/20 0855    01/22/20 0853  Obtain Informed Consent  Once      01/22/20 0852    01/22/20 0852  Case Request Cath Lab: Pulmonary Angiogram/ thrombectomy / [facility]  Once      01/22/20 0852    01/22/20 0838  loperamide (IMODIUM) capsule 4 mg  4 Times Daily PRN      01/22/20 0838    01/22/20 0800  Vital Signs  Every 4 Hours      01/22/20 0741    01/22/20 0742  Code Status and Medical Interventions:  Continuous      01/22/20 0741    01/22/20 0742  Intake & Output  Every Shift      01/22/20 0741    01/22/20 0742  Weigh Patient  Once      01/22/20 0741    01/22/20 0742  Oxygen Therapy- Nasal Cannula; Titrate for SPO2: 90% - 95%  Continuous,   Status:  Canceled      01/22/20 0741    01/22/20 0742  Insert Peripheral IV  Once      01/22/20 0741    01/22/20 0742  Saline Lock & Maintain IV Access  Continuous      01/22/20 0741    01/22/20 0742  Inpatient Admission  Once      01/22/20 0741    01/22/20 0742  VTE Prophylaxis Not Indicated:  Once      01/22/20 0741    01/22/20 0741   acetaminophen (TYLENOL) tablet 650 mg  Every 4 Hours PRN      01/22/20 0741    01/22/20 0741  acetaminophen (TYLENOL) 160 MG/5ML solution 650 mg  Every 4 Hours PRN      01/22/20 0741    01/22/20 0741  acetaminophen (TYLENOL) suppository 650 mg  Every 4 Hours PRN      01/22/20 0741    01/22/20 0741  sodium chloride 0.9 % flush 10 mL  As Needed      01/22/20 0741    01/22/20 0740  BNP  STAT      01/22/20 0739    01/22/20 0658  BNP  Once,   Status:  Canceled      01/22/20 0657    01/22/20 0600  CBC Auto Differential  PROCEDURE ONCE,   Status:  Canceled     Comments:  Discontinue After Heparin Stopped      01/22/20 0003    01/22/20 0522  Manual Differential  Once     Comments:  Discontinue After Heparin Stopped      01/22/20 0521    01/22/20 0500  ECG 12 Lead  Tomorrow AM      01/22/20 0024    01/22/20 0430  CBC & Differential  Daily     Comments:  Discontinue After Heparin Stopped      01/21/20 2100    01/22/20 0430  aPTT  Daily     Comments:  Cancel If Patient Has Infusion Changes      01/21/20 2100    01/22/20 0430  CBC Auto Differential  PROCEDURE ONCE     Comments:  Discontinue After Heparin Stopped      01/21/20 2230    01/22/20 0001  NPO Diet  Diet Effective Midnight,   Status:  Canceled      01/21/20 2100    01/22/20 0000  Ambulatory Referral to Cardiac Rehab      01/22/20 1459    01/21/20 2200  heparin 60811 units/250 mL (100 units/mL) in 0.45 % NaCl infusion  Titrated      01/21/20 2100    01/21/20 2200  escitalopram (LEXAPRO) tablet 20 mg  Nightly      01/21/20 2131 01/21/20 2102  aPTT  STAT,   Status:  Canceled      01/21/20 2101 01/21/20 2057  Inpatient Pulmonology Consult  Once     Specialty:  Pulmonary Disease  Provider:  Momo Cortez MD    01/21/20 2100 01/21/20 2056  morphine injection 2 mg  Every 2 Hours PRN      01/21/20 2100    01/21/20 2055  zolpidem (AMBIEN) tablet 5 mg  Nightly PRN      01/21/20 2100    01/21/20 2054  Diet Regular  Diet Effective Now,   Status:  Canceled       01/21/20 2100 01/21/20 2054  Adjust Heparin Rate Based on aPTT Using Nomogram  Continuous      01/21/20 2100 01/21/20 2054  Verify All Anticoagulant Orders Are Discontinued Upon Initiation of Heparin Protocol (eg Enoxaparin, Fondaparinux, Apixaban, Dabigatran, Edoxaban, or Rivaroxaban)  Once      01/21/20 2100 01/21/20 2054  CBC & Differential  Once     Comments:  Prior to Initial Heparin Bolus      01/21/20 2100 01/21/20 2054  RN To Release aPTT Order 6 Hours After Heparin Bolus & 6 Hours After Any Heparin Rate Change  Continuous      01/21/20 2100 01/21/20 2054  Protime-INR  Once     Comments:  Prior to Initial Heparin Bolus      01/21/20 2100 01/21/20 2054  aPTT  Once     Comments:  Prior to Initial Heparin Bolus      01/21/20 2100 01/21/20 2054  CBC Auto Differential  PROCEDURE ONCE     Comments:  Prior to Initial Heparin Bolus      01/21/20 2100 01/21/20 1909  MRSA Screen, PCR - Swab, Nares  Once      01/21/20 1909    Unscheduled  aPTT  As Needed      01/21/20 2100    Unscheduled  Vital Signs  As Needed      01/22/20 1604    Unscheduled  Change site dressing  As Needed      01/22/20 1604    Unscheduled  Head of Bed: Flat; 2 Hours; Elevate Head of Bed: 30 Degrees; 1 Hour; Activity Level: No Restrictions - Up Ad Ana; If No Bleeding  As Needed      01/22/20 1604    Signed and Held  Basic Metabolic Panel  Morning Draw      Signed and Held                       Physician Progress Notes       Jairo Ag MD at 01/23/20 0830                   LOS: 2 days   Patient Care Team:  Mary Rodgers MD as PCP - General (Internal Medicine)    Subjective     Patient reports she is doing very well she feels much better she is not getting short of breath with exertion today.  She did have some desaturation apparently with sleep.  Talking with her she would not be surprised if she has some sleep apnea she has had some symptoms that her father has sleep apnea.    Review of Systems:           Objective     Vital Signs  Vital Sign Min/Max for last 24 hours  Temp  Min: 97.6 °F (36.4 °C)  Max: 100 °F (37.8 °C)   BP  Min: 97/69  Max: 133/80   Pulse  Min: 60  Max: 99   Resp  Min: 16  Max: 18   SpO2  Min: 89 %  Max: 98 %   Flow (L/min)  Min: 2  Max: 10   Weight  Min: 127 kg (279 lb 5.2 oz)  Max: 127 kg (279 lb 5.2 oz)        Ventilator/Non-Invasive Ventilation Settings (From admission, onward)    None                       Body mass index is 38.98 kg/m².  I/O last 3 completed shifts:  In: 2491 [P.O.:360; I.V.:2131]  Out: 725 [Urine:725]  No intake/output data recorded.        Physical Exam:  General Appearance: Well-developed obese white female she is resting comfortably in bed on room air with oxygen saturations in the mid 90s.  She is not desaturating with talking.  Eyes: Conjunctiva are clear and anicteric  ENT: Mucous membranes are moist no erythema or exudates he has a Mallampati type II airway  Neck: A little large, trachea is midline I do not appreciate jugular venous distention or hepatojugular reflux.  No palpable lymphadenopathy or thyromegaly  Lungs: Clear no wheezes rales or rhonchi nonlabored symmetric expansion  Cardiac: Regular rate rhythm no murmur  Abdomen: Obese soft no palpable hepatosplenomegaly  : Not examined  Musculoskeletal: She has a Band-Aid on the dorsum of her left foot and over her Achilles area.  Her left calf tenderness is decreased and the swelling has decreased.  Right groin cath site is soft no significant ecchymoses or mass  Skin: No jaundice no petechiae skin is warm and dry  Neuro: She is alert oriented cooperative following commands grossly intact  Extremities/P Vascular: No clubbing no cyanosis she has palpable radial and dorsalis pedis pulses bilaterally  MSE: Seems much more relaxed and much better spirits today       Labs:  Results from last 7 days   Lab Units 01/21/20  1131   GLUCOSE mg/dL 109*   SODIUM mmol/L 140   POTASSIUM mmol/L 3.9   CO2 mmol/L 23.0    CHLORIDE mmol/L 101   ANION GAP mmol/L 16.0*   CREATININE mg/dL 0.94   BUN mg/dL 11   BUN / CREAT RATIO  11.7   CALCIUM mg/dL 9.9   EGFR IF NONAFRICN AM mL/min/1.73 65     Estimated Creatinine Clearance: 112.5 mL/min (by C-G formula based on SCr of 0.94 mg/dL).      Results from last 7 days   Lab Units 01/23/20  0138 01/22/20  0440 01/21/20  2129 01/21/20  1131   WBC 10*3/mm3 11.64* 14.82* 15.18* 15.40*   RBC 10*6/mm3 3.50* 4.19 4.33 4.76   HEMOGLOBIN g/dL 10.0* 12.0 12.5 14.4   HEMATOCRIT % 30.4* 36.2 37.6 42.2   MCV fL 86.9 86.4 86.8 88.6   MCH pg 28.6 28.6 28.9 30.2   MCHC g/dL 32.9 33.1 33.2 34.1   RDW % 11.8* 12.4 12.0* 12.8   RDW-SD fl 37.7 39.4 38.3 39.8   MPV fL 9.4 9.3 9.4 7.6   PLATELETS 10*3/mm3 227 282 300 392   NEUTROPHIL % %  --   --  51.2 67.5   LYMPHOCYTE % %  --   --  43.5 26.2   MONOCYTES % %  --   --  4.4* 5.7   EOSINOPHIL % %  --   --  0.3 0.2*   BASOPHIL % %  --   --  0.3 0.4   IMM GRAN % %  --   --  0.3  --    NEUTROS ABS 10*3/mm3 6.82 9.84* 7.78* 10.40*   LYMPHS ABS 10*3/mm3  --   --  6.61* 4.00*   MONOS ABS 10*3/mm3  --   --  0.67 0.90   EOS ABS 10*3/mm3 0.12 0.24 0.04 0.00   BASOS ABS 10*3/mm3  --   --  0.04 0.10   IMMATURE GRANS (ABS) 10*3/mm3  --   --  0.04  --    NRBC /100 WBC  --   --  0.0 0.1         Results from last 7 days   Lab Units 01/21/20  1751   TROPONIN T ng/mL 0.037*     Results from last 7 days   Lab Units 01/21/20  1751   PROBNP pg/mL 5,654.0*             Results from last 7 days   Lab Units 01/21/20 2129 01/21/20  1449   INR  1.07 0.97     Microbiology Results (last 10 days)     Procedure Component Value - Date/Time    MRSA Screen, PCR - Swab, Nares [[id number]]  (Normal) Collected:  01/21/20 1800    Lab Status:  Final result Specimen:  Swab from Nares Updated:  01/21/20 2152     MRSA PCR No MRSA Detected                apixaban 10 mg Oral Q12H   Followed by      [START ON 1/30/2020] apixaban 5 mg Oral Q12H   escitalopram 20 mg Oral Nightly   pantoprazole 40 mg Oral Q AM    sodium chloride 10 mL Intravenous Q12H       heparin (porcine) 11.6 Units/kg/hr Last Rate: 20.6 Units/kg/hr (01/23/20 0744)       Diagnostics:  Ct Chest Pulmonary Embolism    Result Date: 1/21/2020  CT CHEST PULMONARY EMBOLISM-  Date of Exam: 1/21/2020 12:30 PM  Indication: Short of breath with exertion and recent left lower extremity surgery.  . Elevated heart rate.  Comparison: None available.  Technique: Serial and axial CT images of the chest were obtained following the uneventful intravenous administration of 100 cc Isovue-370. contrast. Reconstructions in the coronal and sagittal planes were also performed.  Automated exposure control and iterative reconstruction methods were used.  FINDINGS:  Extensive bilateral pulmonary emboli are present. There is a large embolism within the right main pulmonary artery, and there is near occlusive embolism within the proximal right upper, right middle and right lower lobe pulmonary arteries extending into the subsegmental branches. There is embolic disease in the distal left main pulmonary artery, with near occlusive thrombus within the left upper and left lower lobe pulmonary arteries, extending into the subsegmental branches.  There is concavity of the interventricular septum the heart suggesting right heart strain. Heart size is normal. No pericardial effusion.  No acute airspace disease. Benign calcified granuloma within the right lower lobe.    Cholecystectomy. Small second duodenal segment diverticulum. Remainder of included upper abdominal organs are within normal limits.  No acute osseous abnormality.       Large central bilateral pulmonary emboli,, involving all lobes of both lungs. The pulmonary emboli appear near occlusive in the proximal lobar branches. Signs of right heart strain. 2. Emergency room physician was notified prior to the time of this dictation.    Electronically Signed By-Dr. Edita Crawford MD On:1/21/2020 1:03 PM This report was finalized on  [id number] by Dr. Edita Crawford MD.    Results for orders placed during the hospital encounter of 01/21/20   ADULT TRANSTHORACIC ECHO COMPLETE W/ CONT IF NECESSARY PER PROTOCOL    Narrative · Left ventricular wall thickness is consistent with mild concentric   hypertrophy.  · Estimated EF = 55%.  · Left ventricular systolic function is normal.  · Mild tricuspid valve regurgitation is present.  · Left ventricular diastolic dysfunction (grade I) consistent with   impaired relaxation.  · Left atrial cavity size is mildly dilated.  · Right ventricular cavity is moderate-to-severely dilated.  · Moderately reduced right ventricular systolic function noted.  · RV contractile morphology consistent with positive Paredes sign          EKG shows sinus rhythm with some anterior T inversion    Active Hospital Problems    Diagnosis  POA   • Pulmonary embolism (CMS/HCC) [I26.99]  Yes      Resolved Hospital Problems   No resolved problems to display.         Assessment/Plan     1. Bilateral pulmonary emboli with evidence of RV strain and moderate to severe RV dilation on echocardiogram with elevated proBNP and troponin.  acute cor pulmonale.  Status post bilateral thrombectomy.  On heparin drip to transition to apixaban today  2. Left lower extremity DVT in the gastrocnemius/soleus vein provoked clot with the surgery on that left lower extremity oral contraceptives and obesity.  She probably should try and avoid oral contraceptives in the future and find another method of contraception.  She can talk with her OB about this.  I have talked to her about some precautions.  She is going to need anticoagulation probably with the extent of her pulmonary emboli 6 months   3. acute hypoxic respiratory failure secondary to #1 much improved did require couple liters O2 with sleep.  I will check a nocturnal oximetry tonight if she drops significantly in particular looking at the pattern determine if she needs O2 at discharge and  possibly a sleep study  4. Obesity weight loss would be beneficial  5. Recent 12/26/2019 left lower extremity surgery apparently they removed necrotic bone and cartilage that occurred as a result of a stress fracture she also had a tendon release  6. Anemia mild not unexpected after her thrombectomy will just follow hemoglobin    Plan for disposition:    Jairo Ag MD  01/23/20  8:31 AM    Time:       Electronically signed by Jairo gA MD at 01/23/20 0932     Joanie Chavez MD at 01/23/20 0758            [facility] Follow Up    Chief Complaint: Follow up bilateral pulmonary embolism    Interval History: No chest pain.  Breathing better.  Some lightheadedness with position changes.      Objective:     Objective:  Temp:  [97.6 °F (36.4 °C)-100 °F (37.8 °C)] 97.6 °F (36.4 °C)  Heart Rate:  [60-99] 60  Resp:  [16-18] 17  BP: ()/(59-85) 106/76     Intake/Output Summary (Last 24 hours) at 1/23/2020 0725  Last data filed at 1/23/2020 0557  Gross per 24 hour   Intake 2291 ml   Output 625 ml   Net 1666 ml     Body mass index is 38.98 kg/m².      01/22/20  0558 01/22/20  0742 01/23/20  0506   Weight: 127 kg (279 lb 1.6 oz) 128 kg (281 lb 4.9 oz) 127 kg (279 lb 5.2 oz)     Weight change: 1 kg (2 lb 3.3 oz)      Physical Exam:   General : Alert, cooperative, in no acute distress.  Neuro: Alert,cooperative and oriented.  Lungs: CTAB. Normal respiratory effort and rate.  CV: Regular rate and rhythm, normal S1 and S2, no murmurs, gallops or rubs.  ABD: Soft, nontender, nondistended. Positive bowel sounds.  Extr: No edema or cyanosis, moves all extremities. Right groin soft, no hematoma    Lab Review:   Results from last 7 days   Lab Units 01/21/20  1131   SODIUM mmol/L 140   POTASSIUM mmol/L 3.9   CHLORIDE mmol/L 101   CO2 mmol/L 23.0   BUN mg/dL 11   CREATININE mg/dL 0.94   GLUCOSE mg/dL 109*   CALCIUM mg/dL 9.9     Results from last 7 days   Lab Units 01/21/20  1751   TROPONIN T  ng/mL 0.037*     Results from last 7 days   Lab Units 01/23/20  0138 01/22/20  0440   WBC 10*3/mm3 11.64* 14.82*   HEMOGLOBIN g/dL 10.0* 12.0   HEMATOCRIT % 30.4* 36.2   PLATELETS 10*3/mm3 227 282     Results from last 7 days   Lab Units 01/23/20  0556 01/23/20  0138  01/21/20  2129 01/21/20  1449   INR   --   --   --  1.07 0.97   APTT seconds 78.4* 74.8*   < > 36.7* 16.7*    < > = values in this interval not displayed.               Invalid input(s): LDLCALC  Results from last 7 days   Lab Units 01/21/20  1751   PROBNP pg/mL 5,654.0*         I reviewed the patient's new clinical results.  I personally viewed and interpreted the patient's EKG  Current Medications:   Scheduled Meds:  escitalopram 20 mg Oral Nightly   pantoprazole 40 mg Oral Q AM   sodium chloride 10 mL Intravenous Q12H     Continuous Infusions:  heparin (porcine) 11.6 Units/kg/hr Last Rate: 20.6 Units/kg/hr (01/23/20 0541)       Allergies:  No Known Allergies    Assessment/Plan:     1. Bilateral pulmonary embolism. Now status post bilateral pulmonary artery thrombectomy.  Remains on heparin gtt.  This is likely provoked from recent surgery along with chronic oral contraceptive use.   2. Acute left lower extremity DVT.    3. Acute hypoxic respiratory failure.   On room air following procedure.  Required O2 at night.   4. Right ventricular enlargement/dysfunction  5. Pulmonary hypertension.  Resolved following thrombectomy.   6. Status post left lower extremity surgery on 12/26/2019  7. Chronic oral contraceptive use  8. Anxiety  9. Diarrhea.  Started before admission.  Improved with loperamide.     -  Will transition heparin gtt to apixaban today  -  Have patient ambulate  -  Transfer to telemetry      Joanie Chavez MD  01/23/20  7:25 AM    Electronically signed by Joanie Chavez MD at 01/23/20 0829     Jairo Ag MD at 01/22/20 0815                   LOS: 1 day   Patient Care Team:  Mary Rodgers MD as PCP - General (Internal  Medicine)    Subjective     Patient denies any chest pain pressure heaviness and no shortness of breath on 2-1/2 L O2 at rest she says any exertion however she gets short of breath and a little heavy in her chest.  Reviewing with patient she did have surgery on that left leg she was on oral contraceptives and she is overweight 3 identified risk factors for developing DVT.  Patient is anxious to get on with thrombectomy.    Review of Systems:          Objective     Vital Signs  Vital Sign Min/Max for last 24 hours  Temp  Min: 97.5 °F (36.4 °C)  Max: 98.2 °F (36.8 °C)   BP  Min: 104/72  Max: 127/89   Pulse  Min: 77  Max: 111   Resp  Min: 16  Max: 20   SpO2  Min: 90 %  Max: 97 %   Flow (L/min)  Min: 2  Max: 2.5   Weight  Min: 127 kg (279 lb 1.6 oz)  Max: 129 kg (285 lb 0.9 oz)        Ventilator/Non-Invasive Ventilation Settings (From admission, onward)    None                       Body mass index is 38.93 kg/m².  I/O last 3 completed shifts:  In: 200 [I.V.:200]  Out: 100 [Urine:100]  No intake/output data recorded.        Physical Exam:  General Appearance: Well-developed obese white female she is resting comfortably in bed on 2-1/2 L O2 with oxygen saturations of 90 to 91%  Eyes: Conjunctiva are clear and anicteric  ENT: Mucous membranes are moist no erythema or exudates  Neck: A little large trachea is midline I do not appreciate jugular venous distention or hepatojugular reflux.  No palpable lymphadenopathy or thyromegaly  Lungs: Clear no wheezes rales or rhonchi nonlabored symmetric expansion  Cardiac: She is a little tachycardic right around 100 regular rhythm she has what I believe is a split S2.  No murmur  Abdomen: Obese soft no palpable hepatosplenomegaly  : Not examined  Musculoskeletal: She has a Band-Aid on the dorsum of her left foot and over her Achilles area.  She has tenderness and mild swelling in the left calf  Skin: No jaundice no petechiae skin is warm and dry  Neuro: She is alert oriented  cooperative following commands grossly intact  Extremities/P Vascular: No clubbing no cyanosis she has palpable radial and dorsalis pedis pulses bilaterally  MSE: She is a little anxious about upcoming procedure.       Labs:  Results from last 7 days   Lab Units 01/21/20  1131   GLUCOSE mg/dL 109*   SODIUM mmol/L 140   POTASSIUM mmol/L 3.9   CO2 mmol/L 23.0   CHLORIDE mmol/L 101   ANION GAP mmol/L 16.0*   CREATININE mg/dL 0.94   BUN mg/dL 11   BUN / CREAT RATIO  11.7   CALCIUM mg/dL 9.9   EGFR IF NONAFRICN AM mL/min/1.73 65     Estimated Creatinine Clearance: 112.5 mL/min (by C-G formula based on SCr of 0.94 mg/dL).      Results from last 7 days   Lab Units 01/22/20  0440 01/21/20 2129 01/21/20  1131   WBC 10*3/mm3 14.82* 15.18* 15.40*   RBC 10*6/mm3 4.19 4.33 4.76   HEMOGLOBIN g/dL 12.0 12.5 14.4   HEMATOCRIT % 36.2 37.6 42.2   MCV fL 86.4 86.8 88.6   MCH pg 28.6 28.9 30.2   MCHC g/dL 33.1 33.2 34.1   RDW % 12.4 12.0* 12.8   RDW-SD fl 39.4 38.3 39.8   MPV fL 9.3 9.4 7.6   PLATELETS 10*3/mm3 282 300 392   NEUTROPHIL % %  --  51.2 67.5   LYMPHOCYTE % %  --  43.5 26.2   MONOCYTES % %  --  4.4* 5.7   EOSINOPHIL % %  --  0.3 0.2*   BASOPHIL % %  --  0.3 0.4   IMM GRAN % %  --  0.3  --    NEUTROS ABS 10*3/mm3 9.84* 7.78* 10.40*   LYMPHS ABS 10*3/mm3  --  6.61* 4.00*   MONOS ABS 10*3/mm3  --  0.67 0.90   EOS ABS 10*3/mm3 0.24 0.04 0.00   BASOS ABS 10*3/mm3  --  0.04 0.10   IMMATURE GRANS (ABS) 10*3/mm3  --  0.04  --    NRBC /100 WBC  --  0.0 0.1         Results from last 7 days   Lab Units 01/21/20  1751   TROPONIN T ng/mL 0.037*                 Results from last 7 days   Lab Units 01/21/20 2129 01/21/20  1449   INR  1.07 0.97     Microbiology Results (last 10 days)     Procedure Component Value - Date/Time    MRSA Screen, PCR - Swab, Nares [[id number]]  (Normal) Collected:  01/21/20 1800    Lab Status:  Final result Specimen:  Swab from Nares Updated:  01/21/20 2152     MRSA PCR No MRSA Detected                 escitalopram 20 mg Oral Nightly   sodium chloride 10 mL Intravenous Q12H       heparin (porcine) 11.6 Units/kg/hr Last Rate: 19.6 Units/kg/hr (01/22/20 0546)       Diagnostics:  Ct Chest Pulmonary Embolism    Result Date: 1/21/2020  CT CHEST PULMONARY EMBOLISM-  Date of Exam: 1/21/2020 12:30 PM  Indication: Short of breath with exertion and recent left lower extremity surgery.  . Elevated heart rate.  Comparison: None available.  Technique: Serial and axial CT images of the chest were obtained following the uneventful intravenous administration of 100 cc Isovue-370. contrast. Reconstructions in the coronal and sagittal planes were also performed.  Automated exposure control and iterative reconstruction methods were used.  FINDINGS:  Extensive bilateral pulmonary emboli are present. There is a large embolism within the right main pulmonary artery, and there is near occlusive embolism within the proximal right upper, right middle and right lower lobe pulmonary arteries extending into the subsegmental branches. There is embolic disease in the distal left main pulmonary artery, with near occlusive thrombus within the left upper and left lower lobe pulmonary arteries, extending into the subsegmental branches.  There is concavity of the interventricular septum the heart suggesting right heart strain. Heart size is normal. No pericardial effusion.  No acute airspace disease. Benign calcified granuloma within the right lower lobe.    Cholecystectomy. Small second duodenal segment diverticulum. Remainder of included upper abdominal organs are within normal limits.  No acute osseous abnormality.       Large central bilateral pulmonary emboli,, involving all lobes of both lungs. The pulmonary emboli appear near occlusive in the proximal lobar branches. Signs of right heart strain. 2. Emergency room physician was notified prior to the time of this dictation.    Electronically Signed By-Dr. Edita Crawford MD  On:1/21/2020 1:03 PM This report was finalized on [id number] by Dr. Edita Crawford MD.    Results for orders placed during the hospital encounter of 01/21/20   ADULT TRANSTHORACIC ECHO COMPLETE W/ CONT IF NECESSARY PER PROTOCOL    Narrative · Left ventricular wall thickness is consistent with mild concentric   hypertrophy.  · Estimated EF = 55%.  · Left ventricular systolic function is normal.  · Mild tricuspid valve regurgitation is present.  · Left ventricular diastolic dysfunction (grade I) consistent with   impaired relaxation.  · Left atrial cavity size is mildly dilated.  · Right ventricular cavity is moderate-to-severely dilated.  · Moderately reduced right ventricular systolic function noted.  · RV contractile morphology consistent with positive Paredes sign          EKG shows sinus rhythm with some anterior T inversion    Active Hospital Problems    Diagnosis  POA   • Pulmonary embolism (CMS/HCC) [I26.99]  Yes      Resolved Hospital Problems   No resolved problems to display.         Assessment/Plan     7. Bilateral pulmonary emboli with evidence of RV strain and moderate to severe RV dilation on echocardiogram with elevated proBNP and troponin.  Subacute cor pulmonale this with hypoxia certainly puts her at higher risk.  Patient for thrombectomy/catheter directed thrombolytic per cardiology.  8. Left lower extremity DVT in the gastrocnemius/soleus vein provoked clot with the surgery on that left lower extremity oral contraceptives and obesity.  She probably should try and avoid oral contraceptives in the future and find another method of contraception.  She can talk with her OB about this.  I have talked to her about some precautions.  She is going to need anticoagulation probably with the extent of her pulmonary emboli 6 months   9. acute hypoxic respiratory failure she is requiring 2-1/2 L O2 to keep her saturations around 90 to 91% I think this is related to her pulmonary emboli and should improve  with treatment.  10. Obesity weight loss would be beneficial  11. Recent 12/26/2019 left lower extremity surgery apparently they removed necrotic bone and cartilage that occurred as a result of a stress fracture she also had a tendon release    Plan for disposition:    Jairo Ag MD  01/22/20  8:15 AM    Time: Spent about 36 minutes on patient's care today      Electronically signed by Jairo Ag MD at 01/22/20 0917          Consult Notes       Momo Cortez MD at 01/21/20 2043      Consult Orders    1. Intensivist (on-call MD unless specified) [[id number]] ordered by Adrian Ruvalcaba MD at 01/21/20 1429                           CONSULT NOTE    Patient Identification:  Merry Melara  44 y.o.  female  1975  [id number]            Requesting physician: Dr Jordi Bazzi    Reason for Consultation:  PE, intensivist care  CC: soa    History of Present Illness:  Patient is a 44-year-old with a previous medical history of morbid obesity and anxiety who presented to the emergency room at [facility] who complained of shortness of breath ongoing fora week worse over past day prior to admission associated with some chest pain pleuritic in nature starting over past day.  She was evaluated with a CT PE protocol which showed bilateral pulmonary emboli a lower extremity duplex that showed a DVT and an echocardiogram does show some RV strain.  Our cardiology interventional team was paged and request the patient transfer for planned intervention.    Dr. Dillon called me tonight to discuss the patient and asked for us to evaluate.  Patient is currently on a heparin drip.  Has no worsening pain no worsening shortness of breath no hematuria.  She is slightly anxious.  She has no increased work of breathing.    She is a former smoker quitting in 2004.  She did recently undergo surgery for a orthopedic indication late Decemeber 2019 about 4 weeks ago and had some immobility related to this.  No  prior history of VTE.   Of note she is taking an oral contraceptive that is not on medication list.      Review of Systems:  CONSTITUTIONAL:  Denies fevers or chills  EYE:  No new vision changes  EAR:  No change in hearing  CARDIAC:  + chest pain  PULMONARY:  No productive cough + shortness of breath  GI:  No diarrhea, hematemesis or hematochezia,  RENAL:  No dysuria or urinary frequency  MUSCULOSKELETAL:  No musculoskeletal complaints  ENDOCRINE:  No heat or cold intolerance  INTEGUMENTARY: No skin rashes  NEUROLOGICAL:  No dizziness or confusion.  No seizure activity  PSYCHIATRIC:  No new anxiety or depression  12 system review of systems performed and all else negative    Past Medical History:   Diagnosis Date   • Anxiety        Past Surgical History:   Procedure Laterality Date   • CHOLECYSTECTOMY     • DILATATION AND CURETTAGE     • TENDON RELEASE     • TONSILLECTOMY          Medications Prior to Admission   Medication Sig Dispense Refill Last Dose   • ALPRAZolam (XANAX) 0.25 MG tablet Take 0.25 mg by mouth 2 (Two) Times a Day As Needed for Anxiety.   2020 at Unknown time   • cetirizine (zyrTEC) 10 MG tablet Take 10 mg by mouth Daily.   2020 at Unknown time   • escitalopram (LEXAPRO) 20 MG tablet Take 20 mg by mouth every night at bedtime.   2020 at Unknown time   • Multiple Vitamins-Minerals (MULTIVITAMIN WITH MINERALS) tablet tablet Take 1 tablet by mouth Daily.   2020 at Unknown time   • omeprazole (priLOSEC) 20 MG capsule Take 20 mg by mouth every night at bedtime.   2020 at Unknown time       No Known Allergies    Social History     Socioeconomic History   • Marital status:      Spouse name: Not on file   • Number of children: Not on file   • Years of education: Not on file   • Highest education level: Not on file   Tobacco Use   • Smoking status: Former Smoker     Last attempt to quit: 2004     Years since quittin.0   Substance and Sexual Activity   • Alcohol use:  Never     Frequency: Never     Comment: social   • Drug use: Never   • Sexual activity: Defer       No family history on file.    Physical Exam:  There were no vitals taken for this visit.  There is no height or weight on file to calculate BMI.   General appearance: NAD, conversant   Eyes: anicteric sclerae, moist conjunctivae; no lid-lag; PERRLA  HENT: Atraumatic; oropharynx clear with moist mucous membranes and no mucosal ulcerations; normal hard and soft palate  Neck: Trachea midline; FROM, supple, no thyromegaly or lymphadenopathy  Lungs: CTA, with normal respiratory effort and no intercostal retractions  CV: RRR, no MRGs   Abdomen: Soft, non-tender; no masses or HSM  Extremities: No peripheral edema or extremity lymphadenopathy  LLE with bandaged area of intervention  Skin: Normal temperature, turgor and texture; no rash, ulcers or subcutaneous nodules  Psych: Appropriate affect, alert and oriented to person, place and time  Neuro: CNs II-XII intact non focal, speech intact, sensation intact    LABS:  Results from last 7 days   Lab Units 01/21/20  1131   WBC 10*3/mm3 15.40*   HEMOGLOBIN g/dL 14.4   PLATELETS 10*3/mm3 392     Results from last 7 days   Lab Units 01/21/20  1131   SODIUM mmol/L 140   POTASSIUM mmol/L 3.9   CHLORIDE mmol/L 101   CO2 mmol/L 23.0   BUN mg/dL 11   CREATININE mg/dL 0.94   GLUCOSE mg/dL 109*   CALCIUM mg/dL 9.9   Estimated Creatinine Clearance: 113.5 mL/min (by C-G formula based on SCr of 0.94 mg/dL).    Imaging: I personally visualized the images of scans/x-rays performed within last 3 days.  Imaging Results (Most Recent)     None      CT chest angio - large bilateral PE   TTE RV strain    Assessment / Recommendations:  Bilateral pulmonary embolism  Lower extremity DVT  RV strain    Agree with CCU monitoring  Cont heparin  Reviewed CT very large proximal clot burden.  Catheter based interventional therapies likely indicated.  Defer timing with interventional cardiology/admitting  "service.  Currently she is hemodynamically stable and with good oxygenation.  Stop oral contraceptive, suggested that she discuss alternative methods with her PCP/GYN  Monitor in CCU, patient has a life threatening PE and has a high risk of clinical decline requiring close monitoring.  Reviewed imaging, echo, D/w Dr Bazzi, pt family and bedside RN.      CCT 50minutes tonight outside of time spent on billable procedures.    Momo Cortez MD  [facility]  20  904PM      Electronically signed by Momo Cortez MD at 20 2104       [facility] CATH LAB  [address] [facility] [place] [facility] [address] [phone]             Merry Melara   Invasive peripheral vascular study   Order# [id number]   Reading physician: Joanie Chavez MD Ordering physician: Joanie Chavez MD Study date: 20    Procedures     Pulmonary Angiogram   Right Heart Cath   Percutaneous Manual Thrombectomy- INARI      Patient Information     Patient Name  Merry Melara MRN  [id number] Sex  Female  (Age)  1975 (44 y.o.)   Race Ethnicity Encounter Category   White or  Not  or  Urgent   Patient Hx Of Height, Weight, and Vitals     Height Weight BSA (Calculated - sq m) BMI (kg/m2) Pulse BP   180.3 cm (70.98\") 128 kg (281 lb 4.9 oz) 2.44 sq meters 39.33 91 110/73   Physicians     Panel Physicians Referring Physician Case Authorizing Physician   Joanie Chavez MD (Primary) Corey Bazzi MD Gondi, Sreedevi, MD   Admission Information     Admission Date/Time Discharge Date/Time Room/Bed   20  N340/1       Conclusion     CARDIAC CATHETERIZATION REPORT     DATE OF PROCEDURE: 2020     INDICATION FOR PROCEDURE: Bilateral pulmonary embolism with cor pulmonale     PROCEDURE PERFORMED:   1.  Right heart catheterization  2.  Bilateral pulmonary angiograms  3.  Bilateral pulmonary artery thrombectomy      PROCEDURE COMMENTS:      After informed consent was obtained " the patient was brought to the cardiac catheterization laboratory where her right groin was prepped and draped in a sterile manner.  Moderate sedation was used throughout the procedure.  20 mL of 2% lidocaine was infused subcutaneously into the right groin.  Access to the right femoral vein was obtained using a micropuncture needle and under ultrasound guidance followed by placement of a 4 Argentine angiocatheter.  This was then exchanged for an 8 Argentine sheath.  Selective venogram was performed of the IVC to ensure there was no thrombus.  Next proceeded with a right heart catheterization using              the pulmonary wedge catheter and used to obtain a pulmonary arterial saturation.  The pulmonary wedge catheter was then exchanged over a J-wire for a Grollman catheter.  Selective angiogram of the left pulmonary artery was then performed showing moderate amount of thrombus in the left inferior lobes.  The Grollman catheter was then maneuvered into the right main pulmonary artery and an angiogram of the right pulmonary artery was performed.  This showed thrombus in the right middle and inferior lobes and in the truncus arteriosus.     Proceeded next with pulmonary artery thrombectomy.  Over a supra core wire the 8 Argentine sheath was removed and the access site was dilated using a 12 Argentine followed by 16 Argentine dilators.  This is followed by placement of a 22 Argentine Ola dry seal sheath.  Next using the pulmonary wedge catheter access to the left pulmonary artery was again obtained.  Through the pulmonary wedge catheter and using a Glidewire advantage was able to maneuver the wire into the left lower lobe.  Through the pulmonary wedge catheter I then exchanged out the wire for a Amplatz superstiff wire.  Unfortunately the wire pulled back some and although not optimal decided to proceed with thrombectomy.  The AGC catheter was then advanced and one pass was made total removal of approximately 120 mL of blood.   Unfortunately the first past did not remove any thrombus.  The AGC catheter was removed and then exchanged back out for the pulmonary wedge catheter.  The Super Stiff Amplatz was removed and the again using a Glidewire I was finally able to obtain good wire position in the distal inferior lobe.  The pulmonary wedge catheter was advanced over the Glidewire and the Glidewire was exchanged for the Super Stiff Amplatz.  A second pass was then made using the AGC catheter total removal of approximately 200 mL of blood.  With the second pass I was able to remove the majority of thrombus from the left lower lobe.     Next I turned my attention to the right pulmonary artery.  The pulmonary wedge catheter was advanced again and used to access the right pulmonary artery.  Using this and the Glidewire advantage I was able to access the right middle lobe artery.  Through the pulmonary wedge catheter the Glidewire advantage was exchanged out for a Super Stiff Amplatz wire.  Over this the AGC catheter was advanced and one pass was made and the majority of the thrombus in the middle and lower lobes was then removed along with approximately 120 ml of blood.  After the AGC was removed I then exchanged out the Amplatz wire for a Glidewire advantage using a 5 Malay JR4 diagnostic catheter.  With a JR4 diagnostic catheter I was able to maneuver the Glidewire advantage into the truncus arteriosus.  The Glidewire advantage was then exchanged out for the Super Stiff Amplatz.  The AGC was then advanced but I was unable to angulate upwards into the truncus arteriosus however proceeded with a second pass and a very small amount of chronic appearing thrombus was removed.     At this point I proceeded with repeat bilateral pulmonary angiograms using the Grollman catheter.  This showed a small amount of residual thrombus in the left lower lobe.  There appeared to be no residual thrombus in the right pulmonary arteries.  Repeat right heart  catheterization was then performed using the pulmonary wedge catheter.  Following completion of the procedure the super core wire was replaced through the Lena dry seal sheath.  A figure-of-eight suture was placed in the Lena dry seal sheath was removed.  After hemostasis was achieved the patient was transferred back to the coronary care unit in stable condition.  Of note by the end of the procedure the patient's heart rates had declined into the 70s to 80s and her blood pressures had improved to the 130s over 90s.        FINDINGS:     RIGHT HEART HEMODYNAMICS:       Pre-thrombectomy  1.  Right atrial pressure: 14/11, 10  2.  Right ventricular pressure: 53/6/14  3.  Pulmonary artery pressure: 54/23, 34  4.  Pulmonary artery saturation: 74% on 10 L nonrebreather     Post-thrombectomy  1.  Right atrial pressure: 12/11, 8  2.  Right ventricular pressure: 38/10/12  3.  Pulmonary artery pressure: 36/12, 24  4.  Pulmonary artery saturation: 75% on 10 L nonrebreather        POST-PROCEDURE DIAGNOSIS:   1. Successful bilateral pulmonary artery thrombectomy  2. Resolution of pulmonary artery hypertension     RECOMMENDATIONS:   Continue heparin gtt overnight.  If stable overnight, transition to oral anticoagulation tomorrow.

## 2020-01-24 ENCOUNTER — RESEARCH ENCOUNTER (OUTPATIENT)
Dept: CARDIOLOGY | Facility: CLINIC | Age: 45
End: 2020-01-24

## 2020-01-24 VITALS
SYSTOLIC BLOOD PRESSURE: 115 MMHG | WEIGHT: 279.32 LBS | HEIGHT: 71 IN | OXYGEN SATURATION: 96 % | TEMPERATURE: 98 F | BODY MASS INDEX: 39.1 KG/M2 | RESPIRATION RATE: 16 BRPM | DIASTOLIC BLOOD PRESSURE: 56 MMHG | HEART RATE: 86 BPM

## 2020-01-24 LAB
ANION GAP SERPL CALCULATED.3IONS-SCNC: 16.4 MMOL/L (ref 5–15)
APTT PPP: 33 SECONDS (ref 22.7–35.4)
BASOPHILS # BLD AUTO: 0.03 10*3/MM3 (ref 0–0.2)
BASOPHILS NFR BLD AUTO: 0.3 % (ref 0–1.5)
BUN BLD-MCNC: 5 MG/DL (ref 6–20)
BUN/CREAT SERPL: 6.5 (ref 7–25)
CALCIUM SPEC-SCNC: 9.3 MG/DL (ref 8.6–10.5)
CHLORIDE SERPL-SCNC: 100 MMOL/L (ref 98–107)
CO2 SERPL-SCNC: 21.6 MMOL/L (ref 22–29)
CREAT BLD-MCNC: 0.77 MG/DL (ref 0.57–1)
DEPRECATED RDW RBC AUTO: 38.7 FL (ref 37–54)
EOSINOPHIL # BLD AUTO: 0.13 10*3/MM3 (ref 0–0.4)
EOSINOPHIL NFR BLD AUTO: 1.3 % (ref 0.3–6.2)
ERYTHROCYTE [DISTWIDTH] IN BLOOD BY AUTOMATED COUNT: 12.1 % (ref 12.3–15.4)
GFR SERPL CREATININE-BSD FRML MDRD: 81 ML/MIN/1.73
GLUCOSE BLD-MCNC: 135 MG/DL (ref 65–99)
HCT VFR BLD AUTO: 33.1 % (ref 34–46.6)
HGB BLD-MCNC: 11 G/DL (ref 12–15.9)
IMM GRANULOCYTES # BLD AUTO: 0.1 10*3/MM3 (ref 0–0.05)
IMM GRANULOCYTES NFR BLD AUTO: 1 % (ref 0–0.5)
LYMPHOCYTES # BLD AUTO: 3.56 10*3/MM3 (ref 0.7–3.1)
LYMPHOCYTES NFR BLD AUTO: 35.9 % (ref 19.6–45.3)
MCH RBC QN AUTO: 29.3 PG (ref 26.6–33)
MCHC RBC AUTO-ENTMCNC: 33.2 G/DL (ref 31.5–35.7)
MCV RBC AUTO: 88 FL (ref 79–97)
MONOCYTES # BLD AUTO: 0.39 10*3/MM3 (ref 0.1–0.9)
MONOCYTES NFR BLD AUTO: 3.9 % (ref 5–12)
NEUTROPHILS # BLD AUTO: 5.72 10*3/MM3 (ref 1.7–7)
NEUTROPHILS NFR BLD AUTO: 57.6 % (ref 42.7–76)
NRBC BLD AUTO-RTO: 0 /100 WBC (ref 0–0.2)
PLATELET # BLD AUTO: 287 10*3/MM3 (ref 140–450)
PMV BLD AUTO: 9.3 FL (ref 6–12)
POTASSIUM BLD-SCNC: 3.5 MMOL/L (ref 3.5–5.2)
RBC # BLD AUTO: 3.76 10*6/MM3 (ref 3.77–5.28)
SODIUM BLD-SCNC: 138 MMOL/L (ref 136–145)
WBC NRBC COR # BLD: 9.93 10*3/MM3 (ref 3.4–10.8)

## 2020-01-24 PROCEDURE — 80048 BASIC METABOLIC PNL TOTAL CA: CPT | Performed by: INTERNAL MEDICINE

## 2020-01-24 PROCEDURE — 85730 THROMBOPLASTIN TIME PARTIAL: CPT | Performed by: INTERNAL MEDICINE

## 2020-01-24 PROCEDURE — 85025 COMPLETE CBC W/AUTO DIFF WBC: CPT | Performed by: INTERNAL MEDICINE

## 2020-01-24 PROCEDURE — 99239 HOSP IP/OBS DSCHRG MGMT >30: CPT | Performed by: INTERNAL MEDICINE

## 2020-01-24 RX ADMIN — SODIUM CHLORIDE, PRESERVATIVE FREE 10 ML: 5 INJECTION INTRAVENOUS at 08:47

## 2020-01-24 RX ADMIN — APIXABAN 10 MG: 5 TABLET, FILM COATED ORAL at 08:46

## 2020-01-24 RX ADMIN — PANTOPRAZOLE SODIUM 40 MG: 40 TABLET, DELAYED RELEASE ORAL at 06:55

## 2020-01-24 NOTE — PROGRESS NOTES
Case Management Discharge Note      Final Note: Patient DC'd home              Transportation Services  Private: Car    Final Discharge Disposition Code: 01 - home or self-care

## 2020-01-24 NOTE — PLAN OF CARE
Problem: Patient Care Overview  Goal: Plan of Care Review  Outcome: Ongoing (interventions implemented as appropriate)  Flowsheets (Taken 1/24/2020 7311)  Progress: improving  Plan of Care Reviewed With: patient  Outcome Summary: Denies chest pain or SOA since having procedure to remove clots.  Sats WNL on RA.  Up ad sav in room.

## 2020-01-24 NOTE — PROGRESS NOTES
Continued Stay Note  [facility]     Patient Name: Merry Melara  MRN: [id number]  Today's Date: 1/24/2020    Admit Date: 1/21/2020    Discharge Plan     Row Name 01/24/20 1102       Plan    Plan  Home with family    Patient/Family in Agreement with Plan  yes    Plan Comments  Met with patient at the bedside. Discussed Eliquis has a $0 co-pay for the starter pack. Patient stated it has already been delivered and she has a $10 co-pay card. Probable DC today if ok with pulmnology. Elaine Malhotra RN        Discharge Codes    No documentation.             Elaine Malhotra RN

## 2020-01-24 NOTE — PROGRESS NOTES
LOS: 3 days   Patient Care Team:  Mary Rodgers MD as PCP - General (Internal Medicine)    Subjective     Doing very well slept all night did not require supplemental oxygen.  She is having no chest pain no shortness of breath no melena hematochezia no hematuria    Review of Systems:          Objective     Vital Signs  Vital Sign Min/Max for last 24 hours  Temp  Min: 97.7 °F (36.5 °C)  Max: 97.9 °F (36.6 °C)   BP  Min: 108/64  Max: 129/67   Pulse  Min: 73  Max: 89   Resp  Min: 16  Max: 16   SpO2  Min: 95 %  Max: 97 %   No data recorded   No data recorded        Ventilator/Non-Invasive Ventilation Settings (From admission, onward)    None                       Body mass index is 38.98 kg/m².  I/O last 3 completed shifts:  In: 2024 [P.O.:720; I.V.:1304]  Out: 175 [Urine:175]  No intake/output data recorded.        Physical Exam:  General Appearance: Well-developed obese white female she is up walking around in the room room air no distress  Eyes: Conjunctiva are clear and anicteric  ENT: Mucous membranes are moist no erythema or exudates he has a Mallampati type II airway  Neck: A little large, trachea is midline I do not appreciate jugular venous distention or hepatojugular reflux.  No palpable lymphadenopathy or thyromegaly  Lungs: Clear no wheezes rales or rhonchi nonlabored symmetric expansion  Cardiac: Regular rate rhythm no murmur  Abdomen: Obese soft no palpable hepatosplenomegaly  : Not examined  Musculoskeletal: She has a Band-Aid on the dorsum of her left foot and over her Achilles area.  Her left calf tenderness has resolved  Skin: No jaundice no petechiae skin is warm and dry  Neuro: She is alert oriented cooperative following commands grossly intact  Extremities/P Vascular: No clubbing no cyanosis she has palpable radial and dorsalis pedis pulses bilaterally  MSE: Seems much more relaxed and much better spirits today       Labs:  Results from last 7 days   Lab Units 01/21/20  1131    GLUCOSE mg/dL 109*   SODIUM mmol/L 140   POTASSIUM mmol/L 3.9   CO2 mmol/L 23.0   CHLORIDE mmol/L 101   ANION GAP mmol/L 16.0*   CREATININE mg/dL 0.94   BUN mg/dL 11   BUN / CREAT RATIO  11.7   CALCIUM mg/dL 9.9   EGFR IF NONAFRICN AM mL/min/1.73 65     Estimated Creatinine Clearance: 112.5 mL/min (by C-G formula based on SCr of 0.94 mg/dL).      Results from last 7 days   Lab Units 01/23/20  0138 01/22/20  0440 01/21/20  2129 01/21/20  1131   WBC 10*3/mm3 11.64* 14.82* 15.18* 15.40*   RBC 10*6/mm3 3.50* 4.19 4.33 4.76   HEMOGLOBIN g/dL 10.0* 12.0 12.5 14.4   HEMATOCRIT % 30.4* 36.2 37.6 42.2   MCV fL 86.9 86.4 86.8 88.6   MCH pg 28.6 28.6 28.9 30.2   MCHC g/dL 32.9 33.1 33.2 34.1   RDW % 11.8* 12.4 12.0* 12.8   RDW-SD fl 37.7 39.4 38.3 39.8   MPV fL 9.4 9.3 9.4 7.6   PLATELETS 10*3/mm3 227 282 300 392   NEUTROPHIL % %  --   --  51.2 67.5   LYMPHOCYTE % %  --   --  43.5 26.2   MONOCYTES % %  --   --  4.4* 5.7   EOSINOPHIL % %  --   --  0.3 0.2*   BASOPHIL % %  --   --  0.3 0.4   IMM GRAN % %  --   --  0.3  --    NEUTROS ABS 10*3/mm3 6.82 9.84* 7.78* 10.40*   LYMPHS ABS 10*3/mm3  --   --  6.61* 4.00*   MONOS ABS 10*3/mm3  --   --  0.67 0.90   EOS ABS 10*3/mm3 0.12 0.24 0.04 0.00   BASOS ABS 10*3/mm3  --   --  0.04 0.10   IMMATURE GRANS (ABS) 10*3/mm3  --   --  0.04  --    NRBC /100 WBC  --   --  0.0 0.1         Results from last 7 days   Lab Units 01/21/20  1751   TROPONIN T ng/mL 0.037*     Results from last 7 days   Lab Units 01/21/20  1751   PROBNP pg/mL 5,654.0*             Results from last 7 days   Lab Units 01/21/20 2129 01/21/20  1449   INR  1.07 0.97     Microbiology Results (last 10 days)     Procedure Component Value - Date/Time    MRSA Screen, PCR - Swab, Nares [[id number]]  (Normal) Collected:  01/21/20 1800    Lab Status:  Final result Specimen:  Swab from Nares Updated:  01/21/20 2152     MRSA PCR No MRSA Detected                apixaban 10 mg Oral Q12H   Followed by      [START ON 1/30/2020]  apixaban 5 mg Oral Q12H   escitalopram 20 mg Oral Nightly   pantoprazole 40 mg Oral Q AM   sodium chloride 10 mL Intravenous Q12H          Diagnostics:  Ct Chest Pulmonary Embolism    Result Date: 1/21/2020  CT CHEST PULMONARY EMBOLISM-  Date of Exam: 1/21/2020 12:30 PM  Indication: Short of breath with exertion and recent left lower extremity surgery.  . Elevated heart rate.  Comparison: None available.  Technique: Serial and axial CT images of the chest were obtained following the uneventful intravenous administration of 100 cc Isovue-370. contrast. Reconstructions in the coronal and sagittal planes were also performed.  Automated exposure control and iterative reconstruction methods were used.  FINDINGS:  Extensive bilateral pulmonary emboli are present. There is a large embolism within the right main pulmonary artery, and there is near occlusive embolism within the proximal right upper, right middle and right lower lobe pulmonary arteries extending into the subsegmental branches. There is embolic disease in the distal left main pulmonary artery, with near occlusive thrombus within the left upper and left lower lobe pulmonary arteries, extending into the subsegmental branches.  There is concavity of the interventricular septum the heart suggesting right heart strain. Heart size is normal. No pericardial effusion.  No acute airspace disease. Benign calcified granuloma within the right lower lobe.    Cholecystectomy. Small second duodenal segment diverticulum. Remainder of included upper abdominal organs are within normal limits.  No acute osseous abnormality.       Large central bilateral pulmonary emboli,, involving all lobes of both lungs. The pulmonary emboli appear near occlusive in the proximal lobar branches. Signs of right heart strain. 2. Emergency room physician was notified prior to the time of this dictation.    Electronically Signed By-Dr. Edita Crawford MD On:1/21/2020 1:03 PM This report was  finalized on [id number] by Dr. Edita Crawford MD.    Results for orders placed during the hospital encounter of 01/21/20   ADULT TRANSTHORACIC ECHO COMPLETE W/ CONT IF NECESSARY PER PROTOCOL    Narrative · Left ventricular wall thickness is consistent with mild concentric   hypertrophy.  · Estimated EF = 55%.  · Left ventricular systolic function is normal.  · Mild tricuspid valve regurgitation is present.  · Left ventricular diastolic dysfunction (grade I) consistent with   impaired relaxation.  · Left atrial cavity size is mildly dilated.  · Right ventricular cavity is moderate-to-severely dilated.  · Moderately reduced right ventricular systolic function noted.  · RV contractile morphology consistent with positive Paredes sign          EKG shows sinus rhythm with some anterior T inversion    Active Hospital Problems    Diagnosis  POA   • Pulmonary embolism (CMS/HCC) [I26.99]  Yes      Resolved Hospital Problems   No resolved problems to display.         Assessment/Plan     1. Bilateral pulmonary emboli with evidence of RV strain and moderate to severe RV dilation on echocardiogram with elevated proBNP and troponin.  acute cor pulmonale.  Status post bilateral thrombectomy.  Done well on 24 hours of apixaban okay pulmonary to DC home  2. Left lower extremity DVT in the gastrocnemius/soleus vein provoked clot with the surgery on that left lower extremity oral contraceptives and obesity.  She probably should try and avoid oral contraceptives in the future and find another method of contraception.  She can talk with her OB about this.  I have talked to her about some precautions.  She is going to need anticoagulation probably with the extent of her pulmonary emboli 6 months   3. acute hypoxic respiratory failure resolved she had no significant desaturations through the night.  She really did not have desaturations since she really does not have much sleep apnea symptoms will hold on any sleep study  4. Obesity  weight loss would be beneficial  5. Recent 12/26/2019 left lower extremity surgery apparently they removed necrotic bone and cartilage that occurred as a result of a stress fracture she also had a tendon release  6. Anemia mild not unexpected after her thrombectomy will just follow hemoglobin    Plan for disposition: Okay pulmonary to discharge home we will see her in 3 months with a diffusion study    Jairo Ag MD  01/24/20  11:41 AM    Time:

## 2020-01-24 NOTE — RESEARCH
"Research study:  FLASH  Subject study ID#: [id number]  Subject initials:  DEV    The patient \"AP\" originally presented to [facility] on 1/21/20 with complaints of shortness of air for two days. Diagnostic testing revealed bilateral pulmonary emboli and she was transferred to [facility] for interventional care. Dr Chavez recommended thrombectomy to AP and with the patient agreeable, AP was added to the cath lab schedule on 1/22/20.  That morning, I identified AP as a possible subject for the FLASH study; she met all of the inclusion and none of the exclusion criteria.  I approached AP prior to her procedure on 1/22/20 to assess her interest in participating in the FLASH study.     I found AP resting comfortably in the Coronary Care Unit. Several visitors were in the room. I introduced myself and explained why I was there. I asked if it was ok for the visitors to remain during our conversation; JOCELYN said yes. I proceeded to explain the purpose of the FLASH study and the follow-up schedule which would be aligned with normal office visits. There would be no extra office visits or costs associated with the study. Her consent to enroll in the FLASH study would allow us to review her medical record to obtain data from the procedure, her hospital stay, and follow up visits.  I emphasized that her participation was completely voluntary and in no way would affect her course of care.     AP indicated that she was interested in the study, so we proceeded to review the study consent form page by page. I allowed time for questions which were answered to her satisfaction and she signed the study consent form.  I said that myself or a colleague would return prior to her discharge to ask a few follow up questions and perhaps perform a 6-minute walk test.     Today, my colleague Ruma Gallardo met with JOCELYN as indicated above. AP was unable to perform the 6-minute walk test due to recent foot surgery. But other " portions of the 48-hour worksheet were covered with AP per study protocol. We also gave to AP a copy of her signed study consent form for her records.      We will see AP next after her follow up appointment with Dr. Chavez at the [facility] office.     Other study worksheets that do not require AP's direct involvement include procedure data abstraction and analysis of CT scans, PA grams, and echocardiograms. These worksheets are currently in progress.     Rosa Elena YOUSIF RN  Research Coordinator

## 2020-01-24 NOTE — DISCHARGE SUMMARY
[facility] Discharge    Patient Name: Merry Melara  Age/Sex: 44 y.o. female  : 1975  MRN: [id number]    Encounter Provider: Joanie Chavez MD  Referring Provider: Corey Bazzi MD  Place of Service: [facility] CARDIOLOGY  Patient Care Team:  Mary Rodgers MD as PCP - General (Internal Medicine)         Date of Discharge:  2020     Date of Admit: 2020    Discharge Condition: Stable    Discharge Diagnosis:  1. Bilateral pulmonary embolism with cor pulmonale status post bilateral pulmonary artery thrombectomy  2. Resolved pulmonary hypertension  3. Left lower extremity DVT of the gastrocnemius/soleal vein  4. Right ventricular enlargement/dysfunction  5. Anxiety    Hospital Course:   Merry Melara is a 44 y.o. female with a history of anxiety who was admitted with bilateral pulmonary embolism on 2020.     The patient presented to [facility] yesterday with worsening dyspnea on exertion starting last week. This was associated with chest tightness.  She initially thought the symptoms were due to anxiety but it did not improve with use of alprazolam.  The day prior to her presentation she noted that she could not even go from her couch to her bathroom without getting severely short of breath.    She had recent left lower extremity surgery on 2019 to remove necrotic bone and cartilage and release a tendon.  She reports she only decrease her activity for a few days and was up moving around normally a week following her surgery.  She reports that her family even complained she was doing too much too quickly. She reports left lower extremity swelling but attributed this to her surgery and that it was improving as expected.  She denied any long car or plane trips.  She denied any family history of clotting issues.  She denied any personal history of bleeding issues.  She was on an oral contraceptive.      Following her arrival to  the [facility] emergency room a CT angiogram of the chest showed evidence of bilateral pulmonary embolism with a large amount of proximal thrombus and evidence of right heart strain.  She additionally underwent an echocardiogram that showed evidence of right ventricular enlargement with decreased RV function.  Lower extremity venous doppler showed evidence of acute left calf DVT.  She was started on a heparin gtt and transferred to [place] for further management.  During my initial evaluation she was still having some dyspnea with talking and with any activity.  She was also on 2.5 L nasal cannula with oxygen saturations around 90%.     Following her admission and based on her work-up I recommended proceeding with  bilateral pulmonary artery thrombectomy.  She underwent successful bilateral pulmonary artery thrombectomy on 1/22/2020.  She was noted to have moderate pulmonary hypertension at the start of the procedure which normalized following her thrombectomy.  By the following day she was off of any oxygen and reported significant improvement in her shortness of breath.  The day following the procedure she was transitioned from heparin drip to apixaban.  She was observed for an additional night  And on that evening did have an episode where her heart started racing.  This was associated with sinus tachycardia and no complaints of dyspnea.  It was felt that it may have been due to anxiety.  She had no recurrent episodes throughout the rest of the admission  and was discharged on 1/24/2020 in good condition.    It was felt that her venous thromboembolism was due to a combination of factors including her recent surgery, obesity, and chronic oral contraceptive use.  It was recommended that the patient avoid oral contraceptive use in the future and discuss alternative agents with her gynecologist or primary care physician.  She will probably need to continue anticoagulation for at least 6 months.    She will  follow-up with myself in 4 weeks with a repeat limited echocardiogram to reevaluate her right ventricular size and function.  She will follow-up with Dr. Ag in 3 months with a diffusion study.       Objective:  Temp:  [97.7 °F (36.5 °C)-97.9 °F (36.6 °C)] 97.9 °F (36.6 °C)  Heart Rate:  [73-89] 74  Resp:  [16] 16  BP: (108-118)/(64-74) 117/74  No intake or output data in the 24 hours ending 01/24/20 1318  Body mass index is 38.98 kg/m².      01/22/20  0558 01/22/20  0742 01/23/20  0506   Weight: 127 kg (279 lb 1.6 oz) 128 kg (281 lb 4.9 oz) 127 kg (279 lb 5.2 oz)     Weight change:     Procedures Performed  Procedure(s):  Pulmonary Angiogram  Right Heart Cath  Percutaneous Manual Thrombectomy- INARI       Consults:  Consults     Date and Time Order Name Status Description    1/21/2020 2100 Inpatient Pulmonology Consult      1/21/2020 1429 Intensivist (on-call MD unless specified) Completed           Pertinent Test Results:  Results from last 7 days   Lab Units 01/24/20  1143 01/21/20  1131   SODIUM mmol/L 138 140   POTASSIUM mmol/L 3.5 3.9   CHLORIDE mmol/L 100 101   CO2 mmol/L 21.6* 23.0   BUN mg/dL 5* 11   CREATININE mg/dL 0.77 0.94   GLUCOSE mg/dL 135* 109*   CALCIUM mg/dL 9.3 9.9     Results from last 7 days   Lab Units 01/21/20  1751   TROPONIN T ng/mL 0.037*     Results from last 7 days   Lab Units 01/24/20  1143 01/23/20  0138 01/22/20  0440 01/21/20  2129 01/21/20  1131   WBC 10*3/mm3 9.93 11.64* 14.82* 15.18* 15.40*   HEMOGLOBIN g/dL 11.0* 10.0* 12.0 12.5 14.4   HEMATOCRIT % 33.1* 30.4* 36.2 37.6 42.2   PLATELETS 10*3/mm3 287 227 282 300 392     Results from last 7 days   Lab Units 01/24/20  1143 01/23/20  0556 01/23/20  0138 01/22/20  1547 01/22/20  1115 01/22/20  0440 01/21/20 2129 01/21/20  1449   INR   --   --   --   --   --   --  1.07 0.97   APTT seconds 33.0 78.4* 74.8* 128.4* 51.6* 48.1* 36.7* 16.7*               Invalid input(s): LDLCALC  Results from last 7 days   Lab Units 01/21/20  6477    PROBNP pg/mL 5,654.0*           Discharge Medications     Discharge Medications      New Medications      Instructions Start Date   ELIQUIS STARTER PACK tablet   5 mg, Oral, Every 12 Hours Scheduled   Start Date:  January 30, 2020        Continue These Medications      Instructions Start Date   ALPRAZolam 0.25 MG tablet  Commonly known as:  XANAX   0.25 mg, Oral, 2 Times Daily PRN      cetirizine 10 MG tablet  Commonly known as:  zyrTEC   10 mg, Oral, Daily      escitalopram 20 MG tablet  Commonly known as:  LEXAPRO   20 mg, Oral, Every Night at Bedtime      multivitamin with minerals tablet tablet   1 tablet, Oral, Daily      omeprazole 20 MG capsule  Commonly known as:  priLOSEC   20 mg, Oral, Every Night at Bedtime             Discharge Diet:    Dietary Orders (From admission, onward)     Start     Ordered    01/22/20 1604  Diet Regular  Diet Effective Now     Question:  Diet Texture / Consistency  Answer:  Regular    01/22/20 1603                Activity at Discharge:  as instructed     Discharge disposition: home     Discharge Instructions and Follow ups:  No future appointments.  Additional Instructions for the Follow-ups that You Need to Schedule     Ambulatory Referral to Cardiac Rehab   As directed      Discharge Follow-up with Specified Provider: Dr. Anibal Ag; 3 Months   As directed      To:  Dr. Anibal Ag    Follow Up:  3 Months         Discharge Follow-up with Specified Provider: Dr. Joanie Chavez; 1 Month   As directed      To:  Dr. Joanie Chavez    Follow Up:  1 Month    Follow Up Details:  With an echocardiogram           Follow-up Information     [facility] .    Specialty:  Cardiac Rehabilitation  Contact information:  [address]  [facility] [address] [phone]           Jairo Ag MD Follow up in 3 month(s).    Specialty:  Pulmonary Disease  Why:  With spirometry and diffusion capacity  Contact information:  [address]  [facility]  [address]  [facility] [address]  [phone]             Mary Rodgers MD .    Specialty:  Internal Medicine  Contact information:  [address] [place], [place] [id number] [phone]                   Joanie Chavez MD  01/24/20  1:18 PM    Time: Discharge 45 min

## 2020-01-24 NOTE — PROGRESS NOTES
[facility] Follow Up    Chief Complaint: Follow up bilateral pulmonary embolism    Interval History:  No dyspnea.  Had an episode of heart racing but no associated dyspnea.  Felt to be anxiety.  No recurrent episodes.  Otherwise no dyspnea or chest pain.  Did not require O2 overnight.     Objective:     Objective:  Temp:  [97.7 °F (36.5 °C)-98.7 °F (37.1 °C)] 97.9 °F (36.6 °C)  Heart Rate:  [73-89] 74  Resp:  [16] 16  BP: (106-129)/(64-88) 117/74     Intake/Output Summary (Last 24 hours) at 1/24/2020 0737  Last data filed at 1/23/2020 0822  Gross per 24 hour   Intake 360 ml   Output --   Net 360 ml     Body mass index is 38.98 kg/m².      01/22/20  0558 01/22/20  0742 01/23/20  0506   Weight: 127 kg (279 lb 1.6 oz) 128 kg (281 lb 4.9 oz) 127 kg (279 lb 5.2 oz)     Weight change:       Physical Exam:   General : Alert, cooperative, in no acute distress.  Neuro: Alert,cooperative and oriented.  Lungs: CTAB. Normal respiratory effort and rate.  CV: Regular rate and rhythm, normal S1 and S2, no murmurs, gallops or rubs.  ABD: Soft, nontender, nondistended. Positive bowel sounds.  Extr: No edema or cyanosis, moves all extremities.    Lab Review:   Results from last 7 days   Lab Units 01/21/20  1131   SODIUM mmol/L 140   POTASSIUM mmol/L 3.9   CHLORIDE mmol/L 101   CO2 mmol/L 23.0   BUN mg/dL 11   CREATININE mg/dL 0.94   GLUCOSE mg/dL 109*   CALCIUM mg/dL 9.9     Results from last 7 days   Lab Units 01/21/20  1751   TROPONIN T ng/mL 0.037*     Results from last 7 days   Lab Units 01/23/20  0138 01/22/20  0440   WBC 10*3/mm3 11.64* 14.82*   HEMOGLOBIN g/dL 10.0* 12.0   HEMATOCRIT % 30.4* 36.2   PLATELETS 10*3/mm3 227 282     Results from last 7 days   Lab Units 01/23/20  0556 01/23/20  0138  01/21/20  2129 01/21/20  1449   INR   --   --   --  1.07 0.97   APTT seconds 78.4* 74.8*   < > 36.7* 16.7*    < > = values in this interval not displayed.               Invalid input(s): LDLCALC  Results from last  7 days   Lab Units 01/21/20  1751   PROBNP pg/mL 5,654.0*         I reviewed the patient's new clinical results.  I personally viewed and interpreted the patient's EKG  Current Medications:   Scheduled Meds:  apixaban 10 mg Oral Q12H   Followed by      [START ON 1/30/2020] apixaban 5 mg Oral Q12H   escitalopram 20 mg Oral Nightly   pantoprazole 40 mg Oral Q AM   sodium chloride 10 mL Intravenous Q12H     Continuous Infusions:     Allergies:  No Known Allergies    Assessment/Plan:     1. Bilateral pulmonary embolism. Now status post bilateral pulmonary artery thrombectomy.  This is likely provoked from recent surgery along with chronic oral contraceptive use.  Switched to apixaban.  She is aware that she will need to avoid OCP's going forward.   2. Acute left lower extremity DVT.    3. Acute hypoxic respiratory failure.  On room air since procedure.   4. Right ventricular enlargement/dysfunction  5. Pulmonary hypertension.  Resolved following thrombectomy.   6. Status post left lower extremity surgery on 12/26/2019  7. Chronic oral contraceptive use  8. Anxiety  9. Diarrhea.  Resolved.     -  Discharge home today if ok with Pulmonary.     Joanie Chavez MD  01/24/20  7:37 AM   no

## 2020-01-28 NOTE — PAYOR COMM NOTE
"  [facility]  NPI # [id number]  TID # [id number]      RETURN CONTACT  LEV THOMAS RN [facility]   [facility] /    PH: [phone]  FX: [phone]  ===========================    REFERENCE # [id number] PENDING -       ===========================  REQUEST FOR INPATIENT AUTHORIZATION  DETERMINATION        Please respond to above contact. Thank you.     ===========================    Merry Lomeli (44 y.o. Female)     Date of Birth Social Security Number Address Home Phone MRN    1975  [address] [place] [address] [address] [place] [address]  [id number]    [employer] Marital Status          Adventism        Admission Date Admission Type Admitting Provider Attending Provider Department, Room/Bed    20 Emergency Jenni Kearney MD  [facility] INTENSIVE CARE UNIT,     Discharge Date Discharge Disposition Discharge Destination        [facility] ([facility] - [facility])              Attending Provider:  (none)   Allergies:  No Known Allergies    Isolation:  None   Infection:  None   Code Status:  Prior    Ht:  180.3 cm (71\")   Wt:  129 kg (285 lb 0.9 oz)    Admission Cmt:  None   Principal Problem:  None                Active Insurance as of 2020     Primary Coverage     Payor Plan Insurance Group Employer/Plan Group    [facility] [employer] [facility] [employer] [facility] PPO [id number]     Payor Plan Address Payor Plan Phone Number Payor Plan Fax Number Effective Dates    [address] [phone]  2011 - None Entered    [facility] [address]       Subscriber Name Subscriber Birth Date Member ID       NIKIA LOMELI  [id number]                 Emergency Contacts      (Rel.) Home Phone Work Phone Mobile Phone    donnie lomelivor (Spouse) -- -- [phone]               History & Physical      Jenni Kearney MD at 20 1459          Pulmonary/ Critical Care/ sleep medicine ADMISSION H&P Note        Patient Name:  Merry Lomeli    : "  1975    Medical Record:  [id number]    Primary Care Physician     Mary Rodgers MD    JAY JAY Melara is a 44 y.o. female with a PMH sig for anxiety presented to the ED with complaints of worsening shortness of air with activity x 2 days.  Shortness of air is better at rest.  She reported some chest discomfort with deep breathing.  She reported no increase in swelling or pain to her left lower extremity.  Of note, she underwent a surgical procedure on 12/26/19 to remove some necrotic bone and cartilage that occurred as a result of a stress fracture.  She also had a tendon release in the same extremity.  CT Chest PE was completed and showed positive for bilateral pulmonary emboli involving all lungs.  A lower extremity venous bilateral doppler study was positive for a left lower extremity DVT.  ECHO completed and report pending. She will be admitted and started on a heparin gtt.      Review of Systems    Constitutional:  Denies fever or chills   Eyes:  Denies change in visual acuity   HENT:  Denies nasal congestion or sore throat   Respiratory:  Denies cough + shortness of breath   Cardiovascular:  + chest pain no edema   GI:  Denies abdominal pain, nausea, vomiting, bloody stools or diarrhea   :  Denies dysuria   Musculoskeletal:  Denies back pain or joint pain   Integument:  Denies rash   Neurologic:  Denies headache, focal weakness or sensory changes   Endocrine:  Denies polyuria or polydipsia   Lymphatic:  Denies swollen glands   Psychiatric:  Denies depression + anxiety       Medical History    Past Medical History:   Diagnosis Date   • Anxiety         Surgical History    Past Surgical History:   Procedure Laterality Date   • CHOLECYSTECTOMY     • DILATATION AND CURETTAGE     • TENDON RELEASE     • TONSILLECTOMY          Family History    History reviewed. No pertinent family history.    Social History    Social History     Tobacco Use   • Smoking status: Former Smoker     Last attempt to quit:  2004     Years since quittin.0   Substance Use Topics   • Alcohol use: Never     Frequency: Never     Comment: social        Allergies    No Known Allergies    Medications    Scheduled Meds:  cetirizine 10 mg Oral Daily   escitalopram 20 mg Oral Daily   heparin 10,000 Units Intravenous Once   [START ON 2020] pantoprazole 40 mg Oral QAM   sodium chloride 10 mL Intravenous Q12H   THERA 1 tablet Oral Daily     Continuous Infusions:  heparin 11.6 Units/kg/hr     PRN Meds:.•  ALPRAZolam  •  heparin  •  heparin  •  [COMPLETED] Insert peripheral IV **AND** sodium chloride  •  sodium chloride      Physical Exam    tMax 24 hrs:  Temp (24hrs), Av.6 °F (36.4 °C), Min:97.6 °F (36.4 °C), Max:97.6 °F (36.4 °C)    Vitals Ranges:  Temp:  [97.6 °F (36.4 °C)] 97.6 °F (36.4 °C)  Heart Rate:  [100-111] 100  Resp:  [20] 20  BP: (104-112)/(65-72) 104/72  Intake and Output Last 3 Shifts:  No intake/output data recorded.    Constitutional:  Well developed, well nourished, no acute distress, non-toxic appearance   Eyes:  PERRL, conjunctiva normal   HENT:  Atraumatic, external ears normal, nose normal, oropharynx moist, no pharyngeal exudates. Neck- normal range of motion, no tenderness, supple   Respiratory:  No respiratory distress, normal breath sounds, no rales, no wheezing   Cardiovascular:  Normal rate, normal rhythm, no murmurs, no gallops, no rubs   GI:  Soft, nondistended, normal bowel sounds, nontender, no organomegaly, no mass, no rebound, no guarding   :  No costovertebral angle tenderness   Musculoskeletal:  No edema, no tenderness, no deformities.  Left foot in a post surgical boot  Integument:  Well hydrated, no rash   Lymphatic:  No lymphadenopathy noted   Neurologic:  Alert & oriented x 3, CN 2-12 normal, normal motor function, normal sensory function, no focal deficits noted   Psychiatric:  Speech and behavior appropriate     labs    Lab Results (last 24 hours)     Procedure Component Value Units Date/Time       Protime-INR [[id number]] Collected:  01/21/20 1449    Specimen:  Blood from Arm, Right Updated:  01/21/20 1454    aPTT [[id number]] Collected:  01/21/20 1449    Specimen:  Blood from Arm, Right Updated:  01/21/20 1454    Extra Tubes [[id number]] Collected:  01/21/20 1450    Specimen:  Blood from Arm, Left Updated:  01/21/20 1454    Narrative:       The following orders were created for panel order Extra Tubes.  Procedure                               Abnormality         Status                     ---------                               -----------         ------                     Gold Top - SST[[id number]]                                   In process                   Please view results for these tests on the individual orders.    Gold Top - SST [[id number]] Collected:  01/21/20 1450    Specimen:  Blood from Arm, Left Updated:  01/21/20 1454    Basic Metabolic Panel [[id number]]  (Abnormal) Collected:  01/21/20 1131    Specimen:  Blood Updated:  01/21/20 1157     Glucose 109 mg/dL      BUN 11 mg/dL      Creatinine 0.94 mg/dL      Sodium 140 mmol/L      Potassium 3.9 mmol/L      Chloride 101 mmol/L      CO2 23.0 mmol/L      Calcium 9.9 mg/dL      eGFR Non African Amer 65 mL/min/1.73      BUN/Creatinine Ratio 11.7     Anion Gap 16.0 mmol/L     Narrative:       GFR Normal >60  Chronic Kidney Disease <60  Kidney Failure <15      CBC & Differential [[id number]] Collected:  01/21/20 1131    Specimen:  Blood Updated:  01/21/20 1135    Narrative:       The following orders were created for panel order CBC & Differential.  Procedure                               Abnormality         Status                     ---------                               -----------         ------                     CBC Auto Differential[[id number]]        Abnormal            Final result                 Please view results for these tests on the individual orders.    CBC Auto Differential [[id number]]  (Abnormal) Collected:  01/21/20 1131     Specimen:  Blood Updated:  01/21/20 1135     WBC 15.40 10*3/mm3      RBC 4.76 10*6/mm3      Hemoglobin 14.4 g/dL      Hematocrit 42.2 %      MCV 88.6 fL      MCH 30.2 pg      MCHC 34.1 g/dL      RDW 12.8 %      RDW-SD 39.8 fl      MPV 7.6 fL      Platelets 392 10*3/mm3      Neutrophil % 67.5 %      Lymphocyte % 26.2 %      Monocyte % 5.7 %      Eosinophil % 0.2 %      Basophil % 0.4 %      Neutrophils, Absolute 10.40 10*3/mm3      Lymphocytes, Absolute 4.00 10*3/mm3      Monocytes, Absolute 0.90 10*3/mm3      Eosinophils, Absolute 0.00 10*3/mm3      Basophils, Absolute 0.10 10*3/mm3      nRBC 0.1 /100 WBC           Imaging & Other Studies    Imaging Results (Last 72 Hours)     Procedure Component Value Units Date/Time    CT Chest Pulmonary Embolism [[id number]] Collected:  01/21/20 1259     Updated:  01/21/20 1305    Narrative:       CT CHEST PULMONARY EMBOLISM-     Date of Exam: 1/21/2020 12:30 PM     Indication: Short of breath with exertion and recent left lower  extremity surgery.  . Elevated heart rate.     Comparison: None available.     Technique: Serial and axial CT images of the chest were obtained  following the uneventful intravenous administration of 100 cc  Isovue-370. contrast. Reconstructions in the coronal and sagittal planes  were also performed.  Automated exposure control and iterative  reconstruction methods were used.     FINDINGS:     Extensive bilateral pulmonary emboli are present. There is a large  embolism within the right main pulmonary artery, and there is near  occlusive embolism within the proximal right upper, right middle and  right lower lobe pulmonary arteries extending into the subsegmental  branches. There is embolic disease in the distal left main pulmonary  artery, with near occlusive thrombus within the left upper and left  lower lobe pulmonary arteries, extending into the subsegmental branches.     There is concavity of the interventricular septum the heart suggesting  right  heart strain. Heart size is normal. No pericardial effusion.     No acute airspace disease. Benign calcified granuloma within the right  lower lobe.           Cholecystectomy. Small second duodenal segment diverticulum. Remainder  of included upper abdominal organs are within normal limits.     No acute osseous abnormality.          Impression:       Large central bilateral pulmonary emboli,, involving all lobes of both  lungs. The pulmonary emboli appear near occlusive in the proximal lobar  branches. Signs of right heart strain.  2. Emergency room physician was notified prior to the time of this  dictation.           Electronically Signed By-Dr. Edita Crawford MD On:1/21/2020 1:03 PM  This report was finalized on [id number] by Dr. Edita Crawford MD.          Assessment    Acute submassive Bilateral pulmonary emboli   -s/p recent left lower extremity surgery on 12/26/20, left foot in post op boot  -CT PE study reviewed.  Positive for bilateral PE involving all lungs  -start heparin gtt, will transition to PO at discharge.  Will need at least 6 month of anti-coagulant treatment      Dyspnea on exertion  -secondary to above  -6 minute walk prior to discharge  -can use supplemental oxygen as needed to maintain sats 92%, on RA  -ECHO final report pending    Acute deep vein thrombosis (DVT) of left lower extremity (CMS/HCC)  -venous doppler reviewed     Anxiety disorder  -resume home medication      DVT ppy:  -heparin gtt     PPI ppy:  -protonix (home med)    Full Code    Attending physician statement:  Patient is critically ill.  Total critical care time spent is 32 minutes which does not include any time for procedures.  Critical care time is exclusive of time spent by the nurse practitioner.  Above note scribed by nurse practitioner for me and later reviewed by me for accuracy . I've examined the patient and reviewed all labs and images.    D/w Dr Vidal and reviewed Echo. She does have RV strain on echo with RV  dilation and dysfunction. Given her significant clot burden, she will benefit from EKOS or clot extraction procedure. Will transfer the patient [facility]. Spoke with Dr Bazzi.    I have directly participated in the evaluation and management of this patient.  Jenni Kearney MD      Electronically signed by Jenni Kearney MD at 01/21/20 1649          Discharge Summary      Odalis Eryn, APRN at 01/21/20 1648          Pulmonary/ Critical Care/ sleep medicine discharge summary Note    Date of Discharge:  1/21/2020    Discharge Diagnosis: Acute bilateral pulmonary emboli, acute left lower extremity DVT    Presenting Problem/History of Present Illness  Active Hospital Problems    Diagnosis  POA   • Multiple subsegmental pulmonary emboli without acute cor pulmonale [I26.94]  Yes   • Dyspnea on exertion [R06.09]  Yes   • Acute deep vein thrombosis (DVT) of left lower extremity (CMS/HCC) [I82.402]  Yes   • Anxiety disorder [F41.9]  Yes      Resolved Hospital Problems   No resolved problems to display.          Hospital Course  Admit: 1/21/20    Per H&P:  Merry Melara is a 44 y.o. female with a PMH sig for anxiety presented to the ED with complaints of worsening shortness of air with activity x 2 days.  Shortness of air is better at rest.  She reported some chest discomfort with deep breathing.  She reported no increase in swelling or pain to her left lower extremity.  Of note, she underwent a surgical procedure on 12/26/19 to remove some necrotic bone and cartilage that occurred as a result of a stress fracture.  She also had a tendon release in the same extremity.  CT Chest PE was completed and showed positive for bilateral pulmonary emboli involving all lungs.  A lower extremity venous bilateral doppler study was positive for a left lower extremity DVT.  ECHO completed and report pending. She will be admitted and started on a heparin gtt.      ASSESSMENT & PLAN    Acute submassive Bilateral pulmonary  emboli   -s/p recent left lower extremity surgery on 12/26/20, left foot in post op boot  -CT PE study reviewed.  Positive for bilateral PE involving all lungs  -start heparin gtt, will transition to PO at discharge.  Will need at least 6 month of anti-coagulant treatment      Dyspnea on exertion  -secondary to above  -6 minute walk prior to discharge  -can use supplemental oxygen as needed to maintain sats 92%, on RA  -ECHO :  · Right ventricular cavity is moderate-to-severely dilated.  · Moderately reduced right ventricular systolic function noted.  · RV contractile morphology consistent with positive Paredes sign     Acute deep vein thrombosis (DVT) of left lower extremity (CMS/HCC)  -venous doppler reviewed      Anxiety disorder  -resume home medication        DVT ppy:  -heparin gtt      PPI ppy:  -protonix (home med)     Full Code      RV strain on echo with RV dilation and dysfunction. Given her significant clot burden, she will benefit from EKOS or clot extraction procedure. Will transfer the patient to [facility]. Accepted by Dr. Bazzi.    Procedures Performed         Labs/radiological studies:  Lab Results (last 72 hours)     Procedure Component Value Units Date/Time    MRSA Screen Culture - Swab, Nares [[id number]] Collected:  01/21/20 1621    Specimen:  Swab from Nares Updated:  01/21/20 1647    Extra Tubes [[id number]] Collected:  01/21/20 1450    Specimen:  Blood from Arm, Left Updated:  01/21/20 1600    Narrative:       The following orders were created for panel order Extra Tubes.  Procedure                               Abnormality         Status                     ---------                               -----------         ------                     Gold Top - SST[[id number]]                                   Final result                 Please view results for these tests on the individual orders.    Gold Top - SST [[id number]] Collected:  01/21/20 1450    Specimen:  Blood from Arm, Left  Updated:  01/21/20 1600     Extra Tube Hold for add-ons.     Comment: Auto resulted.       aPTT [[id number]]  (Abnormal) Collected:  01/21/20 1449    Specimen:  Blood from Arm, Right Updated:  01/21/20 1511     PTT 16.7 seconds     Protime-INR [[id number]]  (Normal) Collected:  01/21/20 1449    Specimen:  Blood from Arm, Right Updated:  01/21/20 1511     Protime 10.2 Seconds      INR 0.97    Basic Metabolic Panel [[id number]]  (Abnormal) Collected:  01/21/20 1131    Specimen:  Blood Updated:  01/21/20 1157     Glucose 109 mg/dL      BUN 11 mg/dL      Creatinine 0.94 mg/dL      Sodium 140 mmol/L      Potassium 3.9 mmol/L      Chloride 101 mmol/L      CO2 23.0 mmol/L      Calcium 9.9 mg/dL      eGFR Non African Amer 65 mL/min/1.73      BUN/Creatinine Ratio 11.7     Anion Gap 16.0 mmol/L     Narrative:       GFR Normal >60  Chronic Kidney Disease <60  Kidney Failure <15      CBC & Differential [[id number]] Collected:  01/21/20 1131    Specimen:  Blood Updated:  01/21/20 1135    Narrative:       The following orders were created for panel order CBC & Differential.  Procedure                               Abnormality         Status                     ---------                               -----------         ------                     CBC Auto Differential[[id number]]        Abnormal            Final result                 Please view results for these tests on the individual orders.    CBC Auto Differential [[id number]]  (Abnormal) Collected:  01/21/20 1131    Specimen:  Blood Updated:  01/21/20 1135     WBC 15.40 10*3/mm3      RBC 4.76 10*6/mm3      Hemoglobin 14.4 g/dL      Hematocrit 42.2 %      MCV 88.6 fL      MCH 30.2 pg      MCHC 34.1 g/dL      RDW 12.8 %      RDW-SD 39.8 fl      MPV 7.6 fL      Platelets 392 10*3/mm3      Neutrophil % 67.5 %      Lymphocyte % 26.2 %      Monocyte % 5.7 %      Eosinophil % 0.2 %      Basophil % 0.4 %      Neutrophils, Absolute 10.40 10*3/mm3      Lymphocytes, Absolute 4.00 10*3/mm3       Monocytes, Absolute 0.90 10*3/mm3      Eosinophils, Absolute 0.00 10*3/mm3      Basophils, Absolute 0.10 10*3/mm3      nRBC 0.1 /100 WBC         Ct Chest Pulmonary Embolism    Result Date: 1/21/2020  Large central bilateral pulmonary emboli,, involving all lobes of both lungs. The pulmonary emboli appear near occlusive in the proximal lobar branches. Signs of right heart strain. 2. Emergency room physician was notified prior to the time of this dictation.    Electronically Signed By-Dr. Edita Crawford MD On:1/21/2020 1:03 PM This report was finalized on [id number] by Dr. Edita Crawford MD.      Consults:   Consults     Date and Time Order Name Status Description    1/21/2020 1429 Intensivist (on-call MD unless specified) Completed           Pertinent Test Results: as listed    Condition on Discharge:  stable    Vital Signs  Temp:  [97.6 °F (36.4 °C)-98.2 °F (36.8 °C)] 98.2 °F (36.8 °C)  Heart Rate:  [100-111] 101  Resp:  [16-20] 16  BP: (104-116)/(65-72) 116/70    Physical Exam:  Constitutional:  Well developed, well nourished, no acute distress, non-toxic appearance   Eyes:  PERRL, conjunctiva normal   HENT:  Atraumatic, external ears normal, nose normal, oropharynx moist, no pharyngeal exudates. Neck- normal range of motion, no tenderness, supple   Respiratory:  No respiratory distress, normal breath sounds, no rales, no wheezing   Cardiovascular:  Normal rate, normal rhythm, no murmurs, no gallops, no rubs   GI:  Soft, nondistended, normal bowel sounds, nontender, no organomegaly, no mass, no rebound, no guarding   :  No costovertebral angle tenderness   Musculoskeletal:  No edema, no tenderness, no deformities.  Left foot in a post surgical boot  Integument:  Well hydrated, no rash   Lymphatic:  No lymphadenopathy noted   Neurologic:  Alert & oriented x 3, CN 2-12 normal, normal motor function, normal sensory function, no focal deficits noted   Psychiatric:  Speech and behavior appropriate      Discharge Disposition  Transfer to Another Facility    Discharge Medications     Discharge Medications      ASK your doctor about these medications      Instructions Start Date   ALPRAZolam 0.25 MG tablet  Commonly known as:  XANAX   0.25 mg, Oral, 2 Times Daily PRN      cetirizine 10 MG tablet  Commonly known as:  zyrTEC   10 mg, Oral, Daily      escitalopram 20 MG tablet  Commonly known as:  LEXAPRO   20 mg, Oral, Every Night at Bedtime      multivitamin with minerals tablet tablet   1 tablet, Oral, Daily      omeprazole 20 MG capsule  Commonly known as:  priLOSEC   20 mg, Oral, Every Night at Bedtime             Discharge Diet: NPO    Activity at Discharge: bedrest    Follow-up Appointments  No future appointments.      Test Results Pending at Discharge   Order Current Status    MRSA Screen Culture - Swab, Nares In process           Time: Discharge 31 min            Electronically signed by Jenni Kearney MD at 01/22/20 0478

## 2020-01-29 LAB
HCT VFR BLDA CALC: 31 % (ref 38–51)
HCT VFR BLDA CALC: 34 % (ref 38–51)
HGB BLDA-MCNC: 10.5 G/DL (ref 12–17)
HGB BLDA-MCNC: 11.6 G/DL (ref 12–17)
SAO2 % BLDA: 74 % (ref 95–98)
SAO2 % BLDA: 75 % (ref 95–98)

## 2020-02-24 ENCOUNTER — TELEPHONE (OUTPATIENT)
Dept: CARDIOLOGY | Facility: CLINIC | Age: 45
End: 2020-02-24

## 2020-02-25 ENCOUNTER — OFFICE VISIT (OUTPATIENT)
Dept: CARDIOLOGY | Facility: CLINIC | Age: 45
End: 2020-02-25

## 2020-02-25 ENCOUNTER — RESEARCH ENCOUNTER (OUTPATIENT)
Dept: CARDIOLOGY | Facility: CLINIC | Age: 45
End: 2020-02-25

## 2020-02-25 ENCOUNTER — HOSPITAL ENCOUNTER (OUTPATIENT)
Dept: CARDIOLOGY | Facility: HOSPITAL | Age: 45
Discharge: HOME | End: 2020-02-25
Admitting: INTERNAL MEDICINE

## 2020-02-25 VITALS
HEIGHT: 70 IN | BODY MASS INDEX: 39.94 KG/M2 | HEART RATE: 63 BPM | SYSTOLIC BLOOD PRESSURE: 126 MMHG | DIASTOLIC BLOOD PRESSURE: 72 MMHG | WEIGHT: 279 LBS

## 2020-02-25 VITALS
HEART RATE: 72 BPM | WEIGHT: 285.6 LBS | SYSTOLIC BLOOD PRESSURE: 114 MMHG | BODY MASS INDEX: 39.98 KG/M2 | HEIGHT: 71 IN | OXYGEN SATURATION: 99 % | DIASTOLIC BLOOD PRESSURE: 74 MMHG

## 2020-02-25 DIAGNOSIS — F41.9 ANXIETY DISORDER, UNSPECIFIED TYPE: ICD-10-CM

## 2020-02-25 DIAGNOSIS — I82.4Z2 ACUTE DEEP VEIN THROMBOSIS (DVT) OF DISTAL VEIN OF LEFT LOWER EXTREMITY (HCC): ICD-10-CM

## 2020-02-25 DIAGNOSIS — I26.09 OTHER ACUTE PULMONARY EMBOLISM WITH ACUTE COR PULMONALE (HCC): ICD-10-CM

## 2020-02-25 DIAGNOSIS — I26.94 MULTIPLE SUBSEGMENTAL PULMONARY EMBOLI WITHOUT ACUTE COR PULMONALE (HCC): Primary | ICD-10-CM

## 2020-02-25 LAB
BH CV ECHO MEAS - BSA(HAYCOCK): 2.6 M^2
BH CV ECHO MEAS - BSA: 2.4 M^2
BH CV ECHO MEAS - BZI_BMI: 40 KILOGRAMS/M^2
BH CV ECHO MEAS - BZI_METRIC_HEIGHT: 177.8 CM
BH CV ECHO MEAS - BZI_METRIC_WEIGHT: 126.6 KG
BH CV ECHO MEAS - EDV(MOD-SP2): 116 ML
BH CV ECHO MEAS - EDV(MOD-SP4): 116 ML
BH CV ECHO MEAS - EF(MOD-BP): 64 %
BH CV ECHO MEAS - EF(MOD-SP2): 63.8 %
BH CV ECHO MEAS - EF(MOD-SP4): 62.1 %
BH CV ECHO MEAS - ESV(MOD-SP2): 42 ML
BH CV ECHO MEAS - ESV(MOD-SP4): 44 ML
BH CV ECHO MEAS - LAT PEAK E' VEL: 17 CM/SEC
BH CV ECHO MEAS - LV DIASTOLIC VOL/BSA (35-75): 48.2 ML/M^2
BH CV ECHO MEAS - LV SYSTOLIC VOL/BSA (12-30): 18.3 ML/M^2
BH CV ECHO MEAS - LVLD AP2: 8.2 CM
BH CV ECHO MEAS - LVLD AP4: 8.3 CM
BH CV ECHO MEAS - LVLS AP2: 7.1 CM
BH CV ECHO MEAS - LVLS AP4: 7.4 CM
BH CV ECHO MEAS - MED PEAK E' VEL: 12 CM/SEC
BH CV ECHO MEAS - MV A DUR: 0.15 SEC
BH CV ECHO MEAS - MV A MAX VEL: 75.8 CM/SEC
BH CV ECHO MEAS - MV DEC SLOPE: 458 CM/SEC^2
BH CV ECHO MEAS - MV DEC TIME: 0.19 SEC
BH CV ECHO MEAS - MV E MAX VEL: 87.8 CM/SEC
BH CV ECHO MEAS - MV E/A: 1.2
BH CV ECHO MEAS - MV P1/2T MAX VEL: 88.7 CM/SEC
BH CV ECHO MEAS - MV P1/2T: 56.7 MSEC
BH CV ECHO MEAS - MVA P1/2T LCG: 2.5 CM^2
BH CV ECHO MEAS - MVA(P1/2T): 3.9 CM^2
BH CV ECHO MEAS - RAP SYSTOLE: 3 MMHG
BH CV ECHO MEAS - RVSP: 17.3 MMHG
BH CV ECHO MEAS - SI(MOD-SP2): 30.8 ML/M^2
BH CV ECHO MEAS - SI(MOD-SP4): 29.9 ML/M^2
BH CV ECHO MEAS - SV(MOD-SP2): 74 ML
BH CV ECHO MEAS - SV(MOD-SP4): 72 ML
BH CV ECHO MEAS - TAPSE (>1.6): 2.6 CM2
BH CV ECHO MEAS - TR MAX VEL: 189 CM/SEC
BH CV ECHO MEASUREMENTS AVERAGE E/E' RATIO: 6.06
BH CV XLRA - RV BASE: 2.8 CM
BH CV XLRA - RV LENGTH: 7.7 CM
BH CV XLRA - RV MID: 2.1 CM
BH CV XLRA - TDI S': 18 CM/SEC
LV EF 2D ECHO EST: 64 %

## 2020-02-25 PROCEDURE — 93000 ELECTROCARDIOGRAM COMPLETE: CPT | Performed by: INTERNAL MEDICINE

## 2020-02-25 PROCEDURE — 99214 OFFICE O/P EST MOD 30 MIN: CPT | Performed by: INTERNAL MEDICINE

## 2020-02-25 PROCEDURE — 93325 DOPPLER ECHO COLOR FLOW MAPG: CPT

## 2020-02-25 PROCEDURE — 93321 DOPPLER ECHO F-UP/LMTD STD: CPT | Performed by: INTERNAL MEDICINE

## 2020-02-25 PROCEDURE — 93308 TTE F-UP OR LMTD: CPT

## 2020-02-25 PROCEDURE — 93308 TTE F-UP OR LMTD: CPT | Performed by: INTERNAL MEDICINE

## 2020-02-25 PROCEDURE — 93325 DOPPLER ECHO COLOR FLOW MAPG: CPT | Performed by: INTERNAL MEDICINE

## 2020-02-25 PROCEDURE — 93321 DOPPLER ECHO F-UP/LMTD STD: CPT

## 2020-02-25 NOTE — RESEARCH
[facility]  [address]  [place] [address]  [phone]    Research Note:   FLASH Study    Patient Information:  Study ID#: [id number]  Subject Initials:  PELON BIRD was in the clinic today in follow up with Dr. Chavez.  I met with her to complete her 30-day post-procedure assessment for the FLASH study.  We completed the mMRC Dyspnea scale and PEmbQOL as per study protocol.  PELON completed the 6MWT as part of her participation in the FLASH Study. She ambulated 306 meters. The Macey fatigue and dyspnea scales completed as per study protocol.  Vital signs were as follows:  Starting:   BP: 120/76; HR: 76; O2 Sats 98% Room Air, Ending:  BP: 132/80 HR: 87; O2 Sats 97% Room Air.  PELON reported no fatigue or dyspnea after the walk. She denied any chest pain/ leg pain while walking.  We we see her at the time of her 6-month study follow-up.  In the meantime, she can contact us as needed with any questions or concerns.        Ruma Gallardo RN  Research Coordinator

## 2020-02-25 NOTE — PROGRESS NOTES
Subjective:     Encounter Date:02/25/2020      Patient ID: Merry Melara is a 44 y.o. female.    Chief Complaint:  History of Present Illness    This is a 44-year-old with a history of anxiety, bilateral pulmonary embolism with right ventricular strain, provoked left lower extremity DVT of the gastrocnemius/soleal vein following left ankle/foot surgery, who presents for follow-up.    I saw the patient initially when she was transferred from [facility] on 1/21/2020 with bilateral pulmonary embolism.  The patient had presented to [facility] with complaints of worsening dyspnea on exertion starting the week prior.  This had been associated with chest tightness.  She initially thought the symptoms were due to anxiety but became concerned when it did not improve with taking alprazolam.  The day prior to her presentation she noted that she could not even go from her couch to the bathroom without getting severely short of breath.  She had undergone left lower extremity surgery on 12/26/2019 to remove necrotic bone and cartilage and release the tendon.  She reported that she was down for about few days but then was up and moving around normally about a week after her surgery.  She even reports that her family felt that she was doing too much too quickly.  She did note left lower extremity swelling but attributes this to her surgery.  She also admitted to oral contraceptive use.    Following arrival to the emergency room a CT angiogram of the chest showed evidence of bilateral pulmonary embolism with a large amount of proximal thrombus and evidence of right heart strain.  She underwent an echocardiogram that showed evidence of right ventricular enlargement with decreased function.  Lower extremity venous Doppler showed evidence of an acute left calf DVT.  She was started on a heparin drip and transferred to [facility] for further management.  On my initial evaluation she was noted to be  MEALS SET UP ,PT DID FEED SELF.  PT STATES\" I CAN DO IT. dyspneic with any minimal activity including talking.  She was requiring about 2 to 3 L of oxygen by nasal cannula.  She is otherwise hemodynamically stable.  Based on her findings I recommended proceeding with bilateral pulmonary artery thrombectomy.  She underwent successful bilateral pulmonary artery thrombectomy on 1/22/2020.  She was noted to have moderate pulmonary hypertension at the start of the procedure which normalized following her thrombectomy.  By the following day she was off of oxygen and reported significant improvement in her dyspnea.  She was transitioned from heparin drip to apixaban.  She was discharged on 1/24/2020 without any significant issues.      It was felt that her thromboembolism was due to recent surgery, obesity, and chronic oral contraceptive use.  It is recommended that she avoid oral contraceptive use in the future and discuss alternative contraceptive methods with her gynecologist or primary care physician.  The plan was to continue her on anticoagulation for at least 6 months.    Today she presents for routine follow-up.  She had a repeat limited echocardiogram performed in the office today that showed normalization of her right ventricular function and size.  She just returned to work last week and reports that she did have some dyspnea walking into work which involves a long walk and going up a flight of stairs.  However she attributed this to exerting herself more than she has in the last couple months.  She denies any chest pain, palpitations, orthopnea, near-syncope or syncope.  She developed significant left lower extremity edema when she went back to work last week.  She called Dr. Ag's office and they sent in a prescription for compression hose which she is wearing today.  She reports that with this she is able to fit a regular shoe on her left foot.    Review of Systems   Constitution: Negative for malaise/fatigue.   HENT: Negative for hearing loss, hoarse voice,  nosebleeds and sore throat.    Eyes: Negative for pain.   Cardiovascular: Positive for dyspnea on exertion and leg swelling. Negative for chest pain, claudication, cyanosis, irregular heartbeat, near-syncope, orthopnea, palpitations, paroxysmal nocturnal dyspnea and syncope.   Respiratory: Negative for shortness of breath and snoring.    Endocrine: Negative for cold intolerance, heat intolerance, polydipsia, polyphagia and polyuria.   Skin: Negative for itching and rash.   Musculoskeletal: Negative for arthritis, falls, joint pain, joint swelling, muscle cramps, muscle weakness and myalgias.   Gastrointestinal: Negative for constipation, diarrhea, dysphagia, heartburn, hematemesis, hematochezia, melena, nausea and vomiting.   Genitourinary: Negative for frequency, hematuria and hesitancy.   Neurological: Negative for excessive daytime sleepiness, dizziness, headaches, light-headedness, numbness and weakness.   Psychiatric/Behavioral: Negative for depression. The patient is not nervous/anxious.          Current Outpatient Medications:   •  apixaban (ELIQUIS) 5 MG tablet tablet, Take 1 tablet by mouth Every 12 (Twelve) Hours. Indications: DVT/PE (active thrombosis), Disp: 180 tablet, Rfl: 1  •  cetirizine (zyrTEC) 10 MG tablet, Take 10 mg by mouth Daily., Disp: , Rfl:   •  escitalopram (LEXAPRO) 20 MG tablet, Take 20 mg by mouth every night at bedtime., Disp: , Rfl:   •  Multiple Vitamins-Minerals (MULTIVITAMIN WITH MINERALS) tablet tablet, Take 1 tablet by mouth Daily., Disp: , Rfl:   •  omeprazole (priLOSEC) 20 MG capsule, Take 20 mg by mouth every night at bedtime., Disp: , Rfl:     Past Medical History:   Diagnosis Date   • Anxiety        Past Surgical History:   Procedure Laterality Date   • CARDIAC CATHETERIZATION N/A 1/22/2020    Procedure: Right Heart Cath;  Surgeon: Joanie Chavez MD;  Location: [facility] INVASIVE LOCATION;  Service: Cardiovascular   • CARDIAC CATHETERIZATION  1/22/2020    Procedure:  "Percutaneous Manual Thrombectomy- [facility];  Surgeon: Joanie Chavez MD;  Location:  [facility] INVASIVE LOCATION;  Service: Cardiovascular   • CHOLECYSTECTOMY     • DILATATION AND CURETTAGE     • INTERVENTIONAL RADIOLOGY PROCEDURE Bilateral 2020    Procedure: Pulmonary Angiogram;  Surgeon: Joanie Chavez MD;  Location:  [facility] INVASIVE LOCATION;  Service: Cardiovascular   • TENDON RELEASE     • TONSILLECTOMY         History reviewed. No pertinent family history.    Social History     Tobacco Use   • Smoking status: Former Smoker     Last attempt to quit: 2004     Years since quittin.1   Substance Use Topics   • Alcohol use: Never     Frequency: Never     Comment: social   • Drug use: Never         ECG 12 Lead  Date/Time: 2020 3:34 PM  Performed by: Joanie Chavez MD  Authorized by: Joanie Chavez MD   Comparison: compared with previous ECG   Comparison to previous ECG: Diffuse T wave changes have resolved  Rhythm: sinus rhythm               Objective:     Visit Vitals  /74 (BP Location: Left arm, Patient Position: Sitting, Cuff Size: Large Adult)   Pulse 72   Ht 180.3 cm (71\")   Wt 130 kg (285 lb 9.6 oz)   SpO2 99%   BMI 39.83 kg/m²         Physical Exam   Constitutional: She is oriented to person, place, and time. She appears well-developed and well-nourished.   HENT:   Head: Normocephalic and atraumatic.   Eyes: Pupils are equal, round, and reactive to light. Conjunctivae, EOM and lids are normal.   Neck: Normal range of motion and full passive range of motion without pain. Neck supple. No JVD present. Carotid bruit is not present.   Cardiovascular: Normal rate, regular rhythm, S1 normal and S2 normal. Exam reveals no gallop.   No murmur heard.  Pulses:       Radial pulses are 2+ on the right side, and 2+ on the left side.   Left lower extremity edema with compression stocking   Pulmonary/Chest: Effort normal and breath sounds normal.   Abdominal: Soft. Normal appearance. "   Lymphadenopathy:     She has no cervical adenopathy.   Neurological: She is alert and oriented to person, place, and time.   Skin: Skin is warm, dry and intact.   Psychiatric: She has a normal mood and affect.       Lab Review:       Assessment:          Diagnosis Plan   1. Multiple subsegmental pulmonary emboli without acute cor pulmonale     2. Acute deep vein thrombosis (DVT) of distal vein of left lower extremity (CMS/HCC)     3. Anxiety disorder, unspecified type            Plan:       1.  Bilateral pulmonary embolism with right ventricular strain.  She is status post successful bilateral pulmonary artery thrombectomy.  Her repeat echocardiogram today shows resolution of her right ventricular enlargement and dysfunction.  She is on anticoagulation with apixaban.  And underlying oral contraceptive use.  She is aware that she should avoid oral contraceptive use in the future.  2.  Left lower extremity DVT.   as above.  She does have residual swelling in that left lower extremity now that she is more active.  She is already using compression hose.  We will plan on repeating lower extremity venous Doppler ultrasound before her next follow-up visit to reevaluate her DVT.  3.  Anxiety    We will see the patient back again in about 5 months with a repeat venous Doppler ultrasound to be performed prior to that visit.  We will determine if we can discontinue anticoagulation at that time.

## 2020-07-07 ENCOUNTER — HOSPITAL ENCOUNTER (OUTPATIENT)
Dept: CARDIOLOGY | Facility: HOSPITAL | Age: 45
Discharge: HOME | End: 2020-07-07
Admitting: INTERNAL MEDICINE

## 2020-07-07 DIAGNOSIS — I82.4Z2 ACUTE DEEP VEIN THROMBOSIS (DVT) OF DISTAL VEIN OF LEFT LOWER EXTREMITY (HCC): ICD-10-CM

## 2020-07-07 LAB
BH CV LOWER VASCULAR LEFT COMMON FEMORAL AUGMENT: NORMAL
BH CV LOWER VASCULAR LEFT COMMON FEMORAL COMPETENT: NORMAL
BH CV LOWER VASCULAR LEFT COMMON FEMORAL COMPRESS: NORMAL
BH CV LOWER VASCULAR LEFT COMMON FEMORAL PHASIC: NORMAL
BH CV LOWER VASCULAR LEFT COMMON FEMORAL SPONT: NORMAL
BH CV LOWER VASCULAR LEFT DISTAL FEMORAL COMPRESS: NORMAL
BH CV LOWER VASCULAR LEFT GASTRONEMIUS COMPRESS: NORMAL
BH CV LOWER VASCULAR LEFT GREATER SAPH AK COMPRESS: NORMAL
BH CV LOWER VASCULAR LEFT GREATER SAPH BK COMPRESS: NORMAL
BH CV LOWER VASCULAR LEFT LESSER SAPH COMPRESS: NORMAL
BH CV LOWER VASCULAR LEFT MID FEMORAL AUGMENT: NORMAL
BH CV LOWER VASCULAR LEFT MID FEMORAL COMPETENT: NORMAL
BH CV LOWER VASCULAR LEFT MID FEMORAL COMPRESS: NORMAL
BH CV LOWER VASCULAR LEFT MID FEMORAL PHASIC: NORMAL
BH CV LOWER VASCULAR LEFT MID FEMORAL SPONT: NORMAL
BH CV LOWER VASCULAR LEFT PERONEAL COMPRESS: NORMAL
BH CV LOWER VASCULAR LEFT POPLITEAL AUGMENT: NORMAL
BH CV LOWER VASCULAR LEFT POPLITEAL COMPETENT: NORMAL
BH CV LOWER VASCULAR LEFT POPLITEAL COMPRESS: NORMAL
BH CV LOWER VASCULAR LEFT POPLITEAL PHASIC: NORMAL
BH CV LOWER VASCULAR LEFT POPLITEAL SPONT: NORMAL
BH CV LOWER VASCULAR LEFT POSTERIOR TIBIAL COMPRESS: NORMAL
BH CV LOWER VASCULAR LEFT PROXIMAL FEMORAL COMPRESS: NORMAL
BH CV LOWER VASCULAR LEFT SAPHENOFEMORAL JUNCTION AUGMENT: NORMAL
BH CV LOWER VASCULAR LEFT SAPHENOFEMORAL JUNCTION COMPETENT: NORMAL
BH CV LOWER VASCULAR LEFT SAPHENOFEMORAL JUNCTION COMPRESS: NORMAL
BH CV LOWER VASCULAR LEFT SAPHENOFEMORAL JUNCTION PHASIC: NORMAL
BH CV LOWER VASCULAR LEFT SAPHENOFEMORAL JUNCTION SPONT: NORMAL
BH CV LOWER VASCULAR RIGHT COMMON FEMORAL AUGMENT: NORMAL
BH CV LOWER VASCULAR RIGHT COMMON FEMORAL COMPETENT: NORMAL
BH CV LOWER VASCULAR RIGHT COMMON FEMORAL COMPRESS: NORMAL
BH CV LOWER VASCULAR RIGHT COMMON FEMORAL PHASIC: NORMAL
BH CV LOWER VASCULAR RIGHT COMMON FEMORAL SPONT: NORMAL
BH CV LOWER VASCULAR RIGHT DISTAL FEMORAL COMPRESS: NORMAL
BH CV LOWER VASCULAR RIGHT GASTRONEMIUS COMPRESS: NORMAL
BH CV LOWER VASCULAR RIGHT GREATER SAPH AK COMPRESS: NORMAL
BH CV LOWER VASCULAR RIGHT GREATER SAPH BK COMPRESS: NORMAL
BH CV LOWER VASCULAR RIGHT LESSER SAPH COMPRESS: NORMAL
BH CV LOWER VASCULAR RIGHT MID FEMORAL AUGMENT: NORMAL
BH CV LOWER VASCULAR RIGHT MID FEMORAL COMPETENT: NORMAL
BH CV LOWER VASCULAR RIGHT MID FEMORAL COMPRESS: NORMAL
BH CV LOWER VASCULAR RIGHT MID FEMORAL PHASIC: NORMAL
BH CV LOWER VASCULAR RIGHT MID FEMORAL SPONT: NORMAL
BH CV LOWER VASCULAR RIGHT PERONEAL COMPRESS: NORMAL
BH CV LOWER VASCULAR RIGHT POPLITEAL AUGMENT: NORMAL
BH CV LOWER VASCULAR RIGHT POPLITEAL COMPETENT: NORMAL
BH CV LOWER VASCULAR RIGHT POPLITEAL COMPRESS: NORMAL
BH CV LOWER VASCULAR RIGHT POPLITEAL PHASIC: NORMAL
BH CV LOWER VASCULAR RIGHT POPLITEAL SPONT: NORMAL
BH CV LOWER VASCULAR RIGHT POSTERIOR TIBIAL COMPRESS: NORMAL
BH CV LOWER VASCULAR RIGHT PROXIMAL FEMORAL COMPRESS: NORMAL
BH CV LOWER VASCULAR RIGHT SAPHENOFEMORAL JUNCTION AUGMENT: NORMAL
BH CV LOWER VASCULAR RIGHT SAPHENOFEMORAL JUNCTION COMPETENT: NORMAL
BH CV LOWER VASCULAR RIGHT SAPHENOFEMORAL JUNCTION COMPRESS: NORMAL
BH CV LOWER VASCULAR RIGHT SAPHENOFEMORAL JUNCTION PHASIC: NORMAL
BH CV LOWER VASCULAR RIGHT SAPHENOFEMORAL JUNCTION SPONT: NORMAL
BH CV LOWER VASCULAR RIGHT VARICOSITY BK COMPRESS: NORMAL

## 2020-07-07 PROCEDURE — 93970 EXTREMITY STUDY: CPT

## 2020-07-13 RX ORDER — APIXABAN 5 MG/1
TABLET, FILM COATED ORAL
Qty: 180 TABLET | Refills: 1 | Status: SHIPPED | OUTPATIENT
Start: 2020-07-13

## 2020-07-22 NOTE — PROGRESS NOTES
Planned on discussing results at her follow up which ended up being cancelled.  I called and left a message notifying her of normal lower extremity ultrasound results.

## 2020-08-25 ENCOUNTER — TELEPHONE (OUTPATIENT)
Dept: CARDIOLOGY | Facility: CLINIC | Age: 45
End: 2020-08-25

## 2020-08-25 NOTE — TELEPHONE ENCOUNTER
Called to conduct 6-month followup for FLASH research study; left msg and requested she call me back using my direct line [phone].

## 2020-08-28 ENCOUNTER — TELEPHONE (OUTPATIENT)
Dept: CARDIOLOGY | Facility: CLINIC | Age: 45
End: 2020-08-28

## 2020-08-28 NOTE — TELEPHONE ENCOUNTER
2nd attempt to reach patient to conduct 6-month follow up for the FLASH study. I left my direct line [phone] and requested she call me back.

## 2020-09-02 ENCOUNTER — TELEPHONE (OUTPATIENT)
Dept: CARDIOLOGY | Facility: CLINIC | Age: 45
End: 2020-09-02

## 2020-09-02 NOTE — TELEPHONE ENCOUNTER
3rd/final attempt to reach patient to conduct the 6-month follow up questionnaire for the FLASH study. I left a brief message as to the reason for the call and included my direct line [phone].     Rosa Elena Trejo RN  Research Coordinator

## 2022-07-13 ENCOUNTER — OFFICE (OUTPATIENT)
Dept: URBAN - METROPOLITAN AREA CLINIC 64 | Facility: CLINIC | Age: 47
End: 2022-07-13

## 2022-07-13 VITALS
HEART RATE: 81 BPM | HEIGHT: 70 IN | WEIGHT: 293 LBS | DIASTOLIC BLOOD PRESSURE: 82 MMHG | OXYGEN SATURATION: 98 % | SYSTOLIC BLOOD PRESSURE: 120 MMHG

## 2022-07-13 DIAGNOSIS — K92.0 HEMATEMESIS: ICD-10-CM

## 2022-07-13 PROCEDURE — 99204 OFFICE O/P NEW MOD 45 MIN: CPT | Performed by: INTERNAL MEDICINE

## 2022-07-13 RX ORDER — OMEPRAZOLE 20 MG/1
20 CAPSULE, DELAYED RELEASE ORAL
Qty: 90 | Refills: 5 | Status: ACTIVE
Start: 2022-07-13

## 2022-10-13 ENCOUNTER — ON CAMPUS - OUTPATIENT (OUTPATIENT)
Dept: URBAN - METROPOLITAN AREA HOSPITAL 2 | Facility: HOSPITAL | Age: 47
End: 2022-10-13
Payer: COMMERCIAL

## 2022-10-13 DIAGNOSIS — Z12.11 ENCOUNTER FOR SCREENING FOR MALIGNANT NEOPLASM OF COLON: ICD-10-CM

## 2022-10-13 PROCEDURE — 45378 DIAGNOSTIC COLONOSCOPY: CPT | Mod: 33 | Performed by: INTERNAL MEDICINE

## 2023-01-24 VITALS
OXYGEN SATURATION: 98 % | HEART RATE: 88 BPM | SYSTOLIC BLOOD PRESSURE: 157 MMHG | HEART RATE: 90 BPM | SYSTOLIC BLOOD PRESSURE: 147 MMHG | OXYGEN SATURATION: 96 % | RESPIRATION RATE: 16 BRPM | HEART RATE: 79 BPM | SYSTOLIC BLOOD PRESSURE: 122 MMHG | HEIGHT: 70 IN | SYSTOLIC BLOOD PRESSURE: 129 MMHG | RESPIRATION RATE: 17 BRPM | HEART RATE: 78 BPM | DIASTOLIC BLOOD PRESSURE: 98 MMHG | SYSTOLIC BLOOD PRESSURE: 123 MMHG | DIASTOLIC BLOOD PRESSURE: 97 MMHG | HEART RATE: 86 BPM | DIASTOLIC BLOOD PRESSURE: 92 MMHG | DIASTOLIC BLOOD PRESSURE: 107 MMHG | HEART RATE: 77 BPM | DIASTOLIC BLOOD PRESSURE: 94 MMHG | TEMPERATURE: 97.8 F | RESPIRATION RATE: 18 BRPM | DIASTOLIC BLOOD PRESSURE: 91 MMHG | SYSTOLIC BLOOD PRESSURE: 143 MMHG | HEART RATE: 83 BPM | DIASTOLIC BLOOD PRESSURE: 99 MMHG | DIASTOLIC BLOOD PRESSURE: 86 MMHG | SYSTOLIC BLOOD PRESSURE: 140 MMHG | HEART RATE: 76 BPM | OXYGEN SATURATION: 99 % | OXYGEN SATURATION: 97 % | SYSTOLIC BLOOD PRESSURE: 153 MMHG | DIASTOLIC BLOOD PRESSURE: 102 MMHG

## (undated) DEVICE — CATH DIAG IMPULSE FR4 5F 100CM

## (undated) DEVICE — KT MANIFLD CARDIAC

## (undated) DEVICE — INTRO SHEATH ART/FEM ENGAGE .035 8F12CM

## (undated) DEVICE — HI-TORQUE SUPRA CORE .035 PERIPHERAL GUIDE WIRE .035 X 190 CM: Brand: HI-TORQUE SUPRA CORE

## (undated) DEVICE — CATH PULM GPC PE 4SD/PRT 6.7F 100CM

## (undated) DEVICE — DIL VESL 16F .038 20CM

## (undated) DEVICE — PK CATH CARD 40

## (undated) DEVICE — GW AMPLTZ SUPERSTIFF SHT/TPR STR .035IN 260CM

## (undated) DEVICE — CATH PULM WEDGE PRESS 7F

## (undated) DEVICE — GW EMR FIX EXCHG J STD .035 3MM 260CM

## (undated) DEVICE — INTRO SHEATH DRYSEAL FLEX 22F 7.3TO7.3MM 33CM

## (undated) DEVICE — CAP FLOW TRIEVER INARI MEDICAL

## (undated) DEVICE — RADIFOCUS GLIDEWIRE ADVANTAGE GUIDEWIRE: Brand: GLIDEWIRE ADVANTAGE

## (undated) DEVICE — DIL VESL 14F.038 20CM

## (undated) DEVICE — DIL VESL 12F.038 20CM

## (undated) DEVICE — TRIEVER20 CATHETER, GEN 4: Brand: TRIEVER20

## (undated) DEVICE — Device